# Patient Record
Sex: FEMALE | Race: WHITE | NOT HISPANIC OR LATINO | Employment: FULL TIME | URBAN - METROPOLITAN AREA
[De-identification: names, ages, dates, MRNs, and addresses within clinical notes are randomized per-mention and may not be internally consistent; named-entity substitution may affect disease eponyms.]

---

## 2017-01-20 ENCOUNTER — OFFICE VISIT (OUTPATIENT)
Dept: OBSTETRICS AND GYNECOLOGY | Facility: CLINIC | Age: 57
End: 2017-01-20

## 2017-01-20 VITALS
HEIGHT: 66 IN | DIASTOLIC BLOOD PRESSURE: 80 MMHG | WEIGHT: 177 LBS | BODY MASS INDEX: 28.45 KG/M2 | SYSTOLIC BLOOD PRESSURE: 120 MMHG | RESPIRATION RATE: 14 BRPM

## 2017-01-20 DIAGNOSIS — Z01.419 WELL FEMALE EXAM WITH ROUTINE GYNECOLOGICAL EXAM: Primary | ICD-10-CM

## 2017-01-20 PROCEDURE — 99396 PREV VISIT EST AGE 40-64: CPT | Performed by: OBSTETRICS & GYNECOLOGY

## 2017-01-20 RX ORDER — ESTRADIOL 1 MG/1
1 TABLET ORAL DAILY
Qty: 30 TABLET | Refills: 12 | Status: SHIPPED | OUTPATIENT
Start: 2017-01-20 | End: 2018-12-10 | Stop reason: ALTCHOICE

## 2017-01-20 NOTE — MR AVS SNAPSHOT
Lisseth Cota   2017 9:30 AM   Office Visit    Dept Phone:  [phone]   Encounter #:  [id number]    Provider:  Ilia Jackman MD   Department:  [facility] MEDICAL [facility] WOMEN'S [facility]                Your Full Care Plan              Where to Get Your Medications      These medications were sent to [address] - [phone]  - [phone] [address] [address]     Phone:  [phone]     estradiol 1 MG tablet            Your Updated Medication List          This list is accurate as of: 17 12:13 PM.  Always use your most recent med list.                estradiol 1 MG tablet   Commonly known as:  ESTRACE   Take 1 tablet by mouth Daily.       MULTI-VITAMIN DAILY PO       naproxen 500 MG tablet   Commonly known as:  NAPROSYN   Take 1 tablet by mouth 2 (two) times a day with meals.               You Were Diagnosed With        Codes Comments    Well female exam with routine gynecological exam    -  Primary ICD-10-CM: Z01.419  ICD-9-CM: V72.31       Instructions     None    Patient Instructions History      Upcoming Appointments     Visit Type Date Time Department    ANNUAL 2017  9:30 AM [facility]      MyChart Signup     [facility] [email or web address] allows you to send messages to your doctor, view your test results, renew your prescriptions, schedule appointments, and more. To sign up, go to [employer] and click on the Sign Up Now link in the New User? box. Enter your [email or web address] Activation Code exactly as it appears below along with the last four digits of your Social Security Number and your Date of Birth () to complete the sign-up process. If you do not sign up before the expiration date, you must request a new code.    [email or web address] Activation Code: [email or web address]  Expires: 2/3/2017 12:13 PM    If you have questions, you can email [email or web address] or call  "[phone] to talk to our MyChart staff. Remember, MyChart is NOT to be used for urgent needs. For medical emergencies, dial 911.               Other Info from Your Visit           Allergies     Sulfa Antibiotics  Nausea Only    Penicillins  Rash      Reason for Visit     Gynecologic Exam annual      Vital Signs     Blood Pressure Respirations Height Weight Body Mass Index Smoking Status    120/80 14 65.5\" (166.4 cm) 177 lb (80.3 kg) 29.01 kg/m2 Never Smoker      Problems and Diagnoses Noted     Well female exam with routine gynecological exam    -  Primary        "

## 2017-01-20 NOTE — PROGRESS NOTES
"Chief Complaint: Needs annual exam    Lisseth Cota is a 56 y.o.  Ab1 status post TVH-ovaries retained-presenting for annual exam.  She is on estradiol 1 mg doing well would like to continue.  Fibrocystic breast-stable mammograms.  No urinary or bowel symptoms, no gynecologic complaints.  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 Santamaria-has not had any residual issues      Pertinent items are noted in HPI.       Visit Vitals   • /80   • Resp 14   • Ht 65.5\" (166.4 cm)   • Wt 177 lb (80.3 kg)   • BMI 29.01 kg/m2            Physical Exam    General well developed; well nourished  no acute distress   Skin No suspicious lesions seen   Thyroid normal to inspection and palpation   Lungs breathing is unlabored  clear to auscultation bilaterally   Cardiac regular rate and rhythm, S1, S2 normal, no murmur, click, rub or gallop   Breasts Examined in supine position  Symmetric without masses or skin dimpling  Nipples normal without inversion, lesions or discharge  There are no palpable axillary nodes   Abdomen soft, non-tender; no masses  no umbilical or inginual hernias are present  no hepato-splenomegaly   GYNpelvic Clinical staff was present for exam  External genitalia:  normal appearance of the external genitalia including Bartholin's and [place]'s glands.  :  urethral meatus normal; urethral hypermobility is absent.  Vaginal:  normal pink mucosa without prolapse or lesions. Pap smear obtained  Cervix:  absent.  Uterus:  absent.  Adnexa:  normal bimanual exam of the adnexa.  Rectal:  digital rectal exam not performed; anus visually normal appearing.       Assessment:   1. Status post DAKOTA ovaries retained-doing well  2. HRT no problems would like to continue  3. Fibrocystic breast-encourage mammogram    Plan:  1. Estradiol 1 mg    "

## 2017-09-14 DIAGNOSIS — Z00.00 PHYSICAL EXAM: Primary | ICD-10-CM

## 2017-09-15 ENCOUNTER — LAB (OUTPATIENT)
Dept: FAMILY MEDICINE CLINIC | Facility: CLINIC | Age: 57
End: 2017-09-15

## 2017-09-15 DIAGNOSIS — Z00.00 PHYSICAL EXAM: ICD-10-CM

## 2017-09-15 LAB
25(OH)D3 SERPL-MCNC: 34 NG/ML
ALBUMIN SERPL-MCNC: 4.4 G/DL (ref 3.5–5)
ALBUMIN/GLOB SERPL: 1.5 G/DL (ref 1.5–2.5)
ALP SERPL-CCNC: 60 U/L (ref 35–104)
ALT SERPL W P-5'-P-CCNC: 18 U/L (ref 10–36)
ANION GAP SERPL CALCULATED.3IONS-SCNC: 6 MMOL/L (ref 3.6–11.2)
AST SERPL-CCNC: 21 U/L (ref 10–30)
BASOPHILS # BLD AUTO: 0.03 10*3/MM3 (ref 0–0.3)
BASOPHILS NFR BLD AUTO: 0.6 % (ref 0–2)
BILIRUB SERPL-MCNC: 0.6 MG/DL (ref 0.2–1.8)
BUN BLD-MCNC: 11 MG/DL (ref 7–21)
BUN/CREAT SERPL: 16.2 (ref 7–25)
CALCIUM SPEC-SCNC: 9.6 MG/DL (ref 7.7–10)
CHLORIDE SERPL-SCNC: 109 MMOL/L (ref 99–112)
CHOLEST SERPL-MCNC: 176 MG/DL (ref 0–200)
CO2 SERPL-SCNC: 25 MMOL/L (ref 24.3–31.9)
CREAT BLD-MCNC: 0.68 MG/DL (ref 0.43–1.29)
DEPRECATED RDW RBC AUTO: 40.9 FL (ref 37–54)
EOSINOPHIL # BLD AUTO: 0.1 10*3/MM3 (ref 0–0.7)
EOSINOPHIL NFR BLD AUTO: 1.9 % (ref 0–5)
ERYTHROCYTE [DISTWIDTH] IN BLOOD BY AUTOMATED COUNT: 12.4 % (ref 11.5–14.5)
GFR SERPL CREATININE-BSD FRML MDRD: 90 ML/MIN/1.73
GLOBULIN UR ELPH-MCNC: 3 GM/DL
GLUCOSE BLD-MCNC: 104 MG/DL (ref 70–110)
HCT VFR BLD AUTO: 42.1 % (ref 37–47)
HCV AB SER DONR QL: NORMAL
HDLC SERPL-MCNC: 52 MG/DL (ref 60–100)
HGB BLD-MCNC: 14 G/DL (ref 12–16)
IMM GRANULOCYTES # BLD: 0.01 10*3/MM3 (ref 0–0.03)
IMM GRANULOCYTES NFR BLD: 0.2 % (ref 0–0.5)
LDLC SERPL CALC-MCNC: 113 MG/DL (ref 0–100)
LDLC/HDLC SERPL: 2.17 {RATIO}
LYMPHOCYTES # BLD AUTO: 1.61 10*3/MM3 (ref 1–3)
LYMPHOCYTES NFR BLD AUTO: 30 % (ref 21–51)
MCH RBC QN AUTO: 31 PG (ref 27–33)
MCHC RBC AUTO-ENTMCNC: 33.3 G/DL (ref 33–37)
MCV RBC AUTO: 93.1 FL (ref 80–94)
MONOCYTES # BLD AUTO: 0.41 10*3/MM3 (ref 0.1–0.9)
MONOCYTES NFR BLD AUTO: 7.6 % (ref 0–10)
NEUTROPHILS # BLD AUTO: 3.21 10*3/MM3 (ref 1.4–6.5)
NEUTROPHILS NFR BLD AUTO: 59.7 % (ref 30–70)
OSMOLALITY SERPL CALC.SUM OF ELEC: 279.1 MOSM/KG (ref 273–305)
PLATELET # BLD AUTO: 267 10*3/MM3 (ref 130–400)
PMV BLD AUTO: 10.1 FL (ref 6–10)
POTASSIUM BLD-SCNC: 4.3 MMOL/L (ref 3.5–5.3)
PROT SERPL-MCNC: 7.4 G/DL (ref 6–8)
RBC # BLD AUTO: 4.52 10*6/MM3 (ref 4.2–5.4)
SODIUM BLD-SCNC: 140 MMOL/L (ref 135–153)
TRIGL SERPL-MCNC: 57 MG/DL (ref 0–150)
TSH SERPL DL<=0.05 MIU/L-ACNC: 1.08 MIU/ML (ref 0.55–4.78)
VLDLC SERPL-MCNC: 11.4 MG/DL
WBC NRBC COR # BLD: 5.37 10*3/MM3 (ref 4.5–12.5)

## 2017-09-15 PROCEDURE — 82306 VITAMIN D 25 HYDROXY: CPT | Performed by: FAMILY MEDICINE

## 2017-09-15 PROCEDURE — 80053 COMPREHEN METABOLIC PANEL: CPT | Performed by: FAMILY MEDICINE

## 2017-09-15 PROCEDURE — 86803 HEPATITIS C AB TEST: CPT | Performed by: FAMILY MEDICINE

## 2017-09-15 PROCEDURE — 84443 ASSAY THYROID STIM HORMONE: CPT | Performed by: FAMILY MEDICINE

## 2017-09-15 PROCEDURE — 85025 COMPLETE CBC W/AUTO DIFF WBC: CPT | Performed by: FAMILY MEDICINE

## 2017-09-15 PROCEDURE — 80061 LIPID PANEL: CPT | Performed by: FAMILY MEDICINE

## 2017-09-15 PROCEDURE — 36415 COLL VENOUS BLD VENIPUNCTURE: CPT | Performed by: NURSE PRACTITIONER

## 2017-09-19 ENCOUNTER — OFFICE VISIT (OUTPATIENT)
Dept: FAMILY MEDICINE CLINIC | Facility: CLINIC | Age: 57
End: 2017-09-19

## 2017-09-19 VITALS
HEIGHT: 66 IN | TEMPERATURE: 98.2 F | DIASTOLIC BLOOD PRESSURE: 72 MMHG | BODY MASS INDEX: 27.98 KG/M2 | WEIGHT: 174.1 LBS | SYSTOLIC BLOOD PRESSURE: 126 MMHG | HEART RATE: 82 BPM

## 2017-09-19 DIAGNOSIS — Z00.00 WELL ADULT EXAM: Primary | ICD-10-CM

## 2017-09-19 DIAGNOSIS — Z12.11 SCREENING FOR COLON CANCER: ICD-10-CM

## 2017-09-19 PROCEDURE — 99396 PREV VISIT EST AGE 40-64: CPT | Performed by: FAMILY MEDICINE

## 2017-09-19 PROCEDURE — 90471 IMMUNIZATION ADMIN: CPT | Performed by: FAMILY MEDICINE

## 2017-09-19 PROCEDURE — 90715 TDAP VACCINE 7 YRS/> IM: CPT | Performed by: FAMILY MEDICINE

## 2017-09-25 ENCOUNTER — TELEPHONE (OUTPATIENT)
Dept: GASTROENTEROLOGY | Facility: CLINIC | Age: 57
End: 2017-09-25

## 2017-09-25 ENCOUNTER — DOCUMENTATION (OUTPATIENT)
Dept: GASTROENTEROLOGY | Facility: CLINIC | Age: 57
End: 2017-09-25

## 2017-09-25 DIAGNOSIS — Z12.11 COLON CANCER SCREENING: Primary | ICD-10-CM

## 2017-09-25 NOTE — TELEPHONE ENCOUNTER
Can you please put a case request in for patient to have a Screening Colonoscopy? She is going to purchase Mag. Citrate OTC.     Thank you

## 2017-09-25 NOTE — PROGRESS NOTES
Spoke with patient and she is a Screening Colonoscopy. I scheduled her for 11-28-17 at 9:00 arrival time. I went over instructions with her as well as mailed them to her. Pt. stated that she does not take any ASA products.

## 2017-09-26 PROBLEM — Z12.11 COLON CANCER SCREENING: Status: ACTIVE | Noted: 2017-09-26

## 2017-12-15 ENCOUNTER — HOSPITAL ENCOUNTER (OUTPATIENT)
Facility: HOSPITAL | Age: 57
Setting detail: HOSPITAL OUTPATIENT SURGERY
Discharge: HOME | End: 2017-12-15
Attending: INTERNAL MEDICINE | Admitting: INTERNAL MEDICINE

## 2017-12-15 ENCOUNTER — ANESTHESIA EVENT (OUTPATIENT)
Dept: PERIOP | Facility: HOSPITAL | Age: 57
End: 2017-12-15

## 2017-12-15 ENCOUNTER — ANESTHESIA (OUTPATIENT)
Dept: PERIOP | Facility: HOSPITAL | Age: 57
End: 2017-12-15

## 2017-12-15 VITALS
TEMPERATURE: 97.9 F | HEART RATE: 67 BPM | HEIGHT: 65 IN | WEIGHT: 167 LBS | BODY MASS INDEX: 27.82 KG/M2 | OXYGEN SATURATION: 99 % | RESPIRATION RATE: 18 BRPM | DIASTOLIC BLOOD PRESSURE: 72 MMHG | SYSTOLIC BLOOD PRESSURE: 110 MMHG

## 2017-12-15 PROBLEM — Z12.11 COLON CANCER SCREENING: Status: ACTIVE | Noted: 2017-09-26

## 2017-12-15 PROCEDURE — 25010000002 PROPOFOL 10 MG/ML EMULSION: Performed by: NURSE ANESTHETIST, CERTIFIED REGISTERED

## 2017-12-15 PROCEDURE — 25010000002 PROPOFOL 1000 MG/ML EMULSION: Performed by: NURSE ANESTHETIST, CERTIFIED REGISTERED

## 2017-12-15 PROCEDURE — 45378 DIAGNOSTIC COLONOSCOPY: CPT | Performed by: INTERNAL MEDICINE

## 2017-12-15 RX ORDER — PROPOFOL 10 MG/ML
VIAL (ML) INTRAVENOUS AS NEEDED
Status: DISCONTINUED | OUTPATIENT
Start: 2017-12-15 | End: 2017-12-15 | Stop reason: SURG

## 2017-12-15 RX ORDER — LIDOCAINE HYDROCHLORIDE 20 MG/ML
INJECTION, SOLUTION INFILTRATION; PERINEURAL AS NEEDED
Status: DISCONTINUED | OUTPATIENT
Start: 2017-12-15 | End: 2017-12-15 | Stop reason: SURG

## 2017-12-15 RX ORDER — IPRATROPIUM BROMIDE AND ALBUTEROL SULFATE 2.5; .5 MG/3ML; MG/3ML
3 SOLUTION RESPIRATORY (INHALATION) ONCE AS NEEDED
Status: DISCONTINUED | OUTPATIENT
Start: 2017-12-15 | End: 2017-12-15 | Stop reason: HOSPADM

## 2017-12-15 RX ORDER — ONDANSETRON 2 MG/ML
4 INJECTION INTRAMUSCULAR; INTRAVENOUS ONCE AS NEEDED
Status: DISCONTINUED | OUTPATIENT
Start: 2017-12-15 | End: 2017-12-15 | Stop reason: HOSPADM

## 2017-12-15 RX ORDER — MEPERIDINE HYDROCHLORIDE 25 MG/ML
12.5 INJECTION INTRAMUSCULAR; INTRAVENOUS; SUBCUTANEOUS
Status: DISCONTINUED | OUTPATIENT
Start: 2017-12-15 | End: 2017-12-15 | Stop reason: HOSPADM

## 2017-12-15 RX ORDER — SODIUM CHLORIDE, SODIUM LACTATE, POTASSIUM CHLORIDE, CALCIUM CHLORIDE 600; 310; 30; 20 MG/100ML; MG/100ML; MG/100ML; MG/100ML
INJECTION, SOLUTION INTRAVENOUS CONTINUOUS PRN
Status: DISCONTINUED | OUTPATIENT
Start: 2017-12-15 | End: 2017-12-15 | Stop reason: SURG

## 2017-12-15 RX ORDER — OXYCODONE HYDROCHLORIDE AND ACETAMINOPHEN 5; 325 MG/1; MG/1
1 TABLET ORAL ONCE AS NEEDED
Status: DISCONTINUED | OUTPATIENT
Start: 2017-12-15 | End: 2017-12-15 | Stop reason: HOSPADM

## 2017-12-15 RX ORDER — FENTANYL CITRATE 50 UG/ML
50 INJECTION, SOLUTION INTRAMUSCULAR; INTRAVENOUS
Status: DISCONTINUED | OUTPATIENT
Start: 2017-12-15 | End: 2017-12-15 | Stop reason: HOSPADM

## 2017-12-15 RX ADMIN — PROPOFOL 30 MG: 10 INJECTION, EMULSION INTRAVENOUS at 10:52

## 2017-12-15 RX ADMIN — SODIUM CHLORIDE, POTASSIUM CHLORIDE, SODIUM LACTATE AND CALCIUM CHLORIDE: 600; 310; 30; 20 INJECTION, SOLUTION INTRAVENOUS at 10:51

## 2017-12-15 RX ADMIN — PROPOFOL 200 MCG/KG/MIN: 10 INJECTION, EMULSION INTRAVENOUS at 10:52

## 2017-12-15 RX ADMIN — LIDOCAINE HYDROCHLORIDE 40 MG: 20 INJECTION, SOLUTION INFILTRATION; PERINEURAL at 10:52

## 2017-12-15 NOTE — ANESTHESIA POSTPROCEDURE EVALUATION
Patient: Lisseth Cota    Procedure Summary     Date Anesthesia Start Anesthesia Stop Room / Location    12/15/17 1051 1111 [facility] / [facility]       Procedure Diagnosis Surgeon Provider    COLONOSCOPY FOR SCREENING  CPTCODE:35621 (N/A ) Colon cancer screening  (Colon cancer screening [Z12.11]) MD Gama Morales III, MD          Anesthesia Type: general  Last vitals  BP   103/61 (12/15/17 0918)   Temp   99 °F (37.2 °C) (12/15/17 0918)   Pulse   79 (12/15/17 0918)   Resp   18 (12/15/17 0918)     SpO2   97 % (12/15/17 0918)     Post Anesthesia Care and Evaluation    Patient location during evaluation: bedside  Patient participation: complete - patient participated  Level of consciousness: awake and alert  Pain score: 1  Pain management: adequate  Airway patency: patent  Anesthetic complications: No anesthetic complications  PONV Status: none  Cardiovascular status: acceptable  Respiratory status: acceptable  Hydration status: acceptable

## 2017-12-15 NOTE — ANESTHESIA PREPROCEDURE EVALUATION
Anesthesia Evaluation     no history of anesthetic complications:  NPO Solid Status: > 8 hours  NPO Liquid Status: > 8 hours     Airway   Mallampati: II  TM distance: <3 FB  Neck ROM: full  no difficulty expected  Dental - normal exam     Pulmonary - negative pulmonary ROS and normal exam   Cardiovascular - negative cardio ROS and normal exam        Neuro/Psych- negative ROS  GI/Hepatic/Renal/Endo - negative ROS     Musculoskeletal (-) negative ROS    Abdominal  - normal exam   Substance History - negative use     OB/GYN negative ob/gyn ROS         Other                                        Anesthesia Plan    ASA 2     general     intravenous induction   Anesthetic plan and risks discussed with patient.

## 2017-12-15 NOTE — OP NOTE
12/15/17    COLONOSCOPY FOR SCREENING  Procedure Note    Lisseth Cota  12/15/2017    Pre-op Diagnosis: Colon cancer screening, no prior colonoscopy      Post-op Diagnosis: Normal colonoscopy        Anesthesia: Per Anesthesia service, general anesthesia      Estimated Blood Loss: None      Findings: Normal rectal examination.  Colonoscopy performed to the cecum, confirmed by identification of typical cecal anatomy.  Bowel preparation was fair but adequate.  The scope was retroflexed within the rectum.  All areas examined carefully and no abnormality was seen.  She tolerated the procedure well and there were no complications.  She left the OR in stable and satisfactory condition.      Complications: None      Recommendations:   Findings discussed with the patient  Repeat screening/surveillance colonoscopy in about 10 years      Bimal Hoang III, MD     Date: 12/15/2017  Time: 11:10 AM

## 2017-12-15 NOTE — H&P
"History and physical examination  December 15, 2017    HPI  57-year-old white female presents for screening colonoscopy.  No prior colonoscopy.  Family history negative for colon cancer.      Review of Systems  Negative and noncontributory.    ACTIVE PROBLEMS:   Specialty Problems     None          PAST MEDICAL HISTORY:  Past Medical History:   Diagnosis Date   • Adenomyosis    • Endometriosis    • Enlarged uterus    • Fibrocystic breast    • Fibroid uterus    • Menopausal symptoms    • S/P total abdominal hysterectomy     ovaries remain   • Uterine fibroid    • UTI symptoms        SURGICAL HISTORY:  Past Surgical History:   Procedure Laterality Date   • BLADDER SURGERY     •  SECTION      x3   •  SECTION     •  SECTION     •  SECTION     • HYSTERECTOMY     • TOTAL ABDOMINAL HYSTERECTOMY  2011       FAMILY HISTORY:  Family History   Problem Relation Age of Onset   • Heart disease Mother      by-pass   • Heart disease Father    • Neuropathy Father    • Hypertension Father        SOCIAL HISTORY:  Social History   Substance Use Topics   • Smoking status: Never Smoker   • Smokeless tobacco: Never Used   • Alcohol use No       CURRENT MEDICATION:  No current facility-administered medications for this encounter.      I have reviewed her list of her current medications.    ALLERGIES:  Sulfa antibiotics and Penicillins    VISIT VITALS:  /61 (BP Location: Right arm, Patient Position: Sitting)  Pulse 79  Temp 99 °F (37.2 °C) (Tympanic)   Resp 18  Ht 165.1 cm (65\")  Wt 75.8 kg (167 lb)  SpO2 97%  BMI 27.79 kg/m2    PHYSICAL EXAMINATION:  Physical Exam   Alert, oriented ×3  HEENT: Normal  Neck: No mass  Chest: Clear  Heart: Regular rhythm  Abdomen: Soft, nontender, active BS  Extremities: No edema  Neuro: No focal deficit    Assessment/Plan   Colon cancer screening    REC  Screening colonoscopy is planned.  Procedure, benefits, risks and alternatives were explained " to the patient.         Bimal Hoang III, MD

## 2018-05-01 ENCOUNTER — OFFICE VISIT (OUTPATIENT)
Dept: FAMILY MEDICINE CLINIC | Facility: CLINIC | Age: 58
End: 2018-05-01

## 2018-05-01 VITALS
DIASTOLIC BLOOD PRESSURE: 67 MMHG | WEIGHT: 181.4 LBS | HEART RATE: 73 BPM | TEMPERATURE: 97.5 F | HEIGHT: 65 IN | BODY MASS INDEX: 30.22 KG/M2 | SYSTOLIC BLOOD PRESSURE: 105 MMHG
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
## 2018-05-01 PROCEDURE — 99213 OFFICE O/P EST LOW 20 MIN: CPT | Performed by: FAMILY MEDICINE

## 2018-05-01 NOTE — PROGRESS NOTES
"Subjective     Chief Complaint   Patient presents with   • Tick Removal       Lisseth Cota is a 57 y.o. female.     History of Present Illness removed a tick from left inguinal region yesterday.  Had been on only briefly.  Nodular area but no rash.  Actually better today.  Tetanus current.  No other new concerns.  Has not been ill.    The following portions of the patient's history were reviewed and updated as appropriate: allergies, current medications, past family history, past social history, past surgical history and problem list.    Review of Systems see history of present illness    Objective   Physical Exam   Constitutional: She is oriented to person, place, and time. She appears well-developed and well-nourished.   Neurological: She is oriented to person, place, and time.   Skin: Skin is warm and dry. No rash noted.   Small papule left inguinal area.  Not pustular.  No rash.  No retained foreign body seen   Psychiatric: She has a normal mood and affect.   Vitals reviewed.    /67 (BP Location: Left arm, Patient Position: Sitting, Cuff Size: Adult)   Pulse 73   Temp 97.5 °F (36.4 °C) (Oral)   Ht 165.1 cm (65\")   Wt 82.3 kg (181 lb 6.4 oz)   BMI 30.19 kg/m²   Assessment/Plan   Lisseth was seen today for tick removal.    Diagnoses and all orders for this visit:    Tick bite, initial encounter     Discussed pros and cons of medication.  At this time will observe.  Discussed changes of concern.  Follow-up as needed.           "

## 2018-07-19 ENCOUNTER — OFFICE VISIT (OUTPATIENT)
Dept: FAMILY MEDICINE CLINIC | Facility: CLINIC | Age: 58
End: 2018-07-19

## 2018-07-19 VITALS
HEART RATE: 91 BPM | SYSTOLIC BLOOD PRESSURE: 125 MMHG | HEIGHT: 65 IN | DIASTOLIC BLOOD PRESSURE: 65 MMHG | WEIGHT: 180.3 LBS | BODY MASS INDEX: 30.04 KG/M2 | TEMPERATURE: 97.4 F
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
## 2018-07-19 PROCEDURE — 99213 OFFICE O/P EST LOW 20 MIN: CPT | Performed by: FAMILY MEDICINE

## 2018-07-19 RX ORDER — DOXYCYCLINE 100 MG/1
100 CAPSULE ORAL EVERY 12 HOURS SCHEDULED
Qty: 20 CAPSULE | Refills: 0 | Status: SHIPPED | OUTPATIENT
Start: 2018-07-19 | End: 2018-07-29

## 2018-07-19 NOTE — PROGRESS NOTES
"Subjective     Chief Complaint   Patient presents with   • Tick bite       Lisseth Cota is a 58 y.o. female.     History of Present Illness pull a tick off left anterior lower neck 2 days ago.  Slightly irritated with the appearance of circular rash.  No other problems.  Second tick of season for her.    The following portions of the patient's history were reviewed and updated as appropriate: allergies, current medications, past medical history, past social history, past surgical history and problem list.    Review of Systems see history of present illness    Objective   Physical Exam   Constitutional: She appears well-developed and well-nourished.   Neurological: She is alert.   Skin: Skin is warm and dry.   Minimally irritated lesion left anterior lower neck.  Mild circumferential redness.   Vitals reviewed.    /65 (BP Location: Left arm, Patient Position: Sitting, Cuff Size: Adult)   Pulse 91   Temp 97.4 °F (36.3 °C) (Oral)   Ht 165.1 cm (65\")   Wt 81.8 kg (180 lb 4.8 oz)   BMI 30.00 kg/m²   Assessment/Plan   Lisseth was seen today for tick bite.    Diagnoses and all orders for this visit:    Tick bite, initial encounter  -     doxycycline (MONODOX) 100 MG capsule; Take 1 capsule by mouth Every 12 (Twelve) Hours for 10 days.      Antibiotic.  Continue observation.  Tetanus current.  No reason to check titer.  Follow-up or contact if problems.         "

## 2018-12-10 ENCOUNTER — OFFICE VISIT (OUTPATIENT)
Dept: OBSTETRICS AND GYNECOLOGY | Facility: CLINIC | Age: 58
End: 2018-12-10

## 2018-12-10 VITALS
SYSTOLIC BLOOD PRESSURE: 114 MMHG | HEIGHT: 65 IN | BODY MASS INDEX: 29.36 KG/M2 | WEIGHT: 176.2 LBS | DIASTOLIC BLOOD PRESSURE: 62 MMHG

## 2018-12-10 DIAGNOSIS — Z01.419 ENCNTR FOR GYN EXAM (GENERAL) (ROUTINE) W/O ABN FINDINGS: Primary | ICD-10-CM

## 2018-12-10 DIAGNOSIS — Z12.31 SCREENING MAMMOGRAM, ENCOUNTER FOR: ICD-10-CM

## 2018-12-10 DIAGNOSIS — N94.10 FEMALE DYSPAREUNIA: ICD-10-CM

## 2018-12-10 PROCEDURE — 99396 PREV VISIT EST AGE 40-64: CPT | Performed by: OBSTETRICS & GYNECOLOGY

## 2018-12-10 RX ORDER — ASPIRIN 81 MG/1
TABLET, COATED ORAL
COMMUNITY
Start: 2018-10-25 | End: 2022-10-24

## 2018-12-10 NOTE — PROGRESS NOTES
"Subjective   Chief Complaint   Patient presents with   • Gynecologic Exam     annual exam     Lisseth Cota is a 58 y.o. year old  who is post-menopausal.  She is S/P hysterectomy presenting to be seen for her annual exam.  This past year she had been been on hormone replacement therapy until recent knee surgery where she discontinued prior to surgery and has not resumed.  There has not been vaginal bleeding in the last 12 months.  Menopausal symptoms are present (hot flashes) but not affecting her quality of life . Denies history of abnormal pap smears. Last mammogram: two years ago. Last colonoscopy: 2018.    SEXUAL Hx:  She is currently sexually active.  In the past year there there has been NO new sexual partners.    Condoms are never used.  She would not like to be screened for STD's at today's exam.  [place] is painful: yes - but has not tried OTC lubricants   HEALTH Hx:  She exercises regularly: yes.  She wears her seat belt: yes.  She has concerns about domestic violence: no.  OTHER THINGS SHE WANTS TO DISCUSS TODAY:  Nothing else    The following portions of the patient's history were reviewed and updated as appropriate:problem list, current medications, allergies, past family history, past medical history, past social history and past surgical history.    Social History    Tobacco Use      Smoking status: Never Smoker      Smokeless tobacco: Never Used    Review of Systems  Constitutional POS: nothing reported    NEG: anorexia or night sweats   Genitourinary POS: nothing reported    NEG: dysuria or hematuria   Gastointestinal POS: nothing reported    NEG: bloating, change in bowel habits, melena or reflux symptoms   Integument POS: nothing reported    NEG: moles that are changing in size, shape, color or rashes   Breast POS: nothing reported    NEG: persistent breast lump, skin dimpling or nipple discharge        Objective   /62   Ht 165.1 cm (65\")   Wt 79.9 kg (176 lb 3.2 oz)   BMI " 29.32 kg/m²     General:  well developed; well nourished  no acute distress   Skin:  No suspicious lesions seen   Thyroid: normal to inspection and palpation   Breasts:  Examined in supine position  Symmetric without masses or skin dimpling  Nipples normal without inversion, lesions or discharge  There are no palpable axillary nodes   Abdomen: soft, non-tender; no masses  no umbilical or inguinal hernias are present  no hepato-splenomegaly   Pelvis: Clinical staff was present for exam  External genitalia:  normal appearance of the external genitalia including Bartholin's and [place]'s glands.  :  urethral meatus normal;  Vaginal:  atrophic mucosal changes are present;  Cervix:  absent.  Uterus:  absent.  Adnexa:  normal bimanual exam of the adnexa.        Assessment   1. Normal GYN exam in postmenopausal female s/p DAKOTA  2. Dyspareunia  3. She is up to date on all relevant gynecologic and colorectal screenings except mammography     Plan   1. Pap was not done today.  I explained to Lisseth that the Pap smears are no longer recommended in patient's after hysterectomy.   I stressed to Lisseth that she still should be seen to be seen yearly for a full physical including breast and pelvic exam.  2. She was encouraged to get yearly mammograms.  She should report any palpable breast lump(s) or skin changes regardless of mammographic findings.  I explained to Lisseth that notification regarding her mammogram results will come from the center performing the study.  Our office will not be routinely calling with mammogram results.  It is her responsibility to make sure that the results from the mammogram are communicated to her by the breast center.  If she has any questions about the results, she is welcome to call our office anytime.  3. Patient given samples of Intrarosa. Will send prescription in if patient notices improvement in symptoms.  4. The importance of keeping all planned follow-up and taking all medications as  prescribed was emphasized.  5. Follow up for annual exam in 1 year.    No orders of the defined types were placed in this encounter.         This note was electronically signed.    Lisa Wen, DO  December 10, 2018    Note: Speech recognition transcription software may have been used to create portions of this document.  An attempt at proofreading has been made but errors in transcription could still be present.

## 2019-02-01 ENCOUNTER — OFFICE VISIT (OUTPATIENT)
Dept: FAMILY MEDICINE CLINIC | Facility: CLINIC | Age: 59
End: 2019-02-01

## 2019-02-01 VITALS
HEART RATE: 87 BPM | SYSTOLIC BLOOD PRESSURE: 126 MMHG | DIASTOLIC BLOOD PRESSURE: 68 MMHG | TEMPERATURE: 99 F | HEIGHT: 65 IN | WEIGHT: 178 LBS | BODY MASS INDEX: 29.66 KG/M2

## 2019-02-01 DIAGNOSIS — J02.9 SORE THROAT: Primary | ICD-10-CM

## 2019-02-01 DIAGNOSIS — Z12.31 SCREENING MAMMOGRAM, ENCOUNTER FOR: ICD-10-CM

## 2019-02-01 DIAGNOSIS — J01.00 ACUTE MAXILLARY SINUSITIS, RECURRENCE NOT SPECIFIED: ICD-10-CM

## 2019-02-01 LAB
EXPIRATION DATE: NORMAL
INTERNAL CONTROL: NORMAL
Lab: NORMAL
S PYO AG THROAT QL: NEGATIVE

## 2019-02-01 PROCEDURE — 87880 STREP A ASSAY W/OPTIC: CPT | Performed by: NURSE PRACTITIONER

## 2019-02-01 PROCEDURE — 99213 OFFICE O/P EST LOW 20 MIN: CPT | Performed by: NURSE PRACTITIONER

## 2019-02-01 RX ORDER — DOXYCYCLINE 100 MG/1
100 CAPSULE ORAL 2 TIMES DAILY
Qty: 20 CAPSULE | Refills: 0 | Status: SHIPPED | OUTPATIENT
Start: 2019-02-01 | End: 2019-02-11

## 2019-02-01 NOTE — PROGRESS NOTES
"Subjective   Lisseth Cota is a 58 y.o. female.     Patient is presenting today for new onset symptoms.  Symptoms started 2 days ago.  She is having upper respiratory symptoms that throat is severely sore.   Mucus is clear.  Sinus pain pressure \"eyes\"hurt.  He has low-grade temperature today of 99.  Denies any nausea or vomiting or GI symptoms. No chest pain or shortness of breath.          The following portions of the patient's history were reviewed and updated as appropriate: past family history, past social history and past surgical history.    Review of Systems   Constitutional: Positive for chills and fever. Negative for appetite change and fatigue.   HENT: Positive for congestion, rhinorrhea and sinus pressure. Negative for sore throat, trouble swallowing and voice change.    Eyes: Negative for discharge, itching and visual disturbance.   Respiratory: Negative for cough, choking, chest tightness and shortness of breath.    Cardiovascular: Negative for chest pain, palpitations and leg swelling.   Gastrointestinal: Negative for abdominal pain, blood in stool, constipation, diarrhea and nausea.   Genitourinary: Negative for dysuria, frequency and hematuria.   Musculoskeletal: Negative for arthralgias, back pain and myalgias.   Skin: Negative for rash and wound.   Allergic/Immunologic: Positive for environmental allergies.   Neurological: Negative for dizziness, weakness and headaches.   Hematological: Does not bruise/bleed easily.   Psychiatric/Behavioral: Negative for sleep disturbance. The patient is not nervous/anxious.        Objective   Physical Exam   Constitutional: She appears well-developed. No distress.   HENT:   Head: Normocephalic and atraumatic.   Nose: Mucosal edema and rhinorrhea present. Right sinus exhibits maxillary sinus tenderness. Left sinus exhibits maxillary sinus tenderness.   Eyes: Conjunctivae and EOM are normal.   Neck: Neck supple. No JVD present.   Cardiovascular: Normal rate and " normal heart sounds.   Pulmonary/Chest: Effort normal and breath sounds normal. No respiratory distress.   Abdominal: Soft. Bowel sounds are normal. She exhibits no distension.   Musculoskeletal: Normal range of motion. She exhibits no edema.   Neurological: She is alert.   Skin: Skin is warm. No rash noted.   Psychiatric: She has a normal mood and affect.       Assessment/Plan   Lisseth was seen today for sore throat.  Instructed to start cetirizine 10 mg daily over next 2 weeks, use Flonase 2 sprays to each nostril daily over next 2 weeks as well.  She has these medications at home and does not need prescription sent.  Instructed to rest and increase fluids, avoid allergens and irritants.  Strep screen negative today.  We will prescribe doxycycline 100 mg by mouth twice a day for 10 days for sinusitis.  She will only start medication of doxycycline if mucus becomes colored or worsens.  She will schedule mammogram.  Instructed to return to clinic if symptoms worsen or persist.  She verbalizes understanding.  Diagnoses and all orders for this visit:    Sore throat  -     POC Rapid Strep A    Screening mammogram, encounter for  -     Mammo Screening Bilateral With CAD; Future    Acute maxillary sinusitis, recurrence not specified    Other orders  -     Cancel: Mammo Screening Digital Tomosynthesis Bilateral With CAD; Future  -     doxycycline (MONODOX) 100 MG capsule; Take 1 capsule by mouth 2 (Two) Times a Day for 10 days.

## 2019-02-21 ENCOUNTER — HOSPITAL ENCOUNTER (OUTPATIENT)
Dept: MAMMOGRAPHY | Facility: HOSPITAL | Age: 59
Discharge: HOME | End: 2019-02-21
Admitting: NURSE PRACTITIONER

## 2019-02-21 DIAGNOSIS — Z12.31 SCREENING MAMMOGRAM, ENCOUNTER FOR: ICD-10-CM

## 2019-02-21 PROCEDURE — 77067 SCR MAMMO BI INCL CAD: CPT | Performed by: RADIOLOGY

## 2019-02-21 PROCEDURE — 77063 BREAST TOMOSYNTHESIS BI: CPT

## 2019-02-21 PROCEDURE — 77067 SCR MAMMO BI INCL CAD: CPT

## 2019-02-21 PROCEDURE — 77063 BREAST TOMOSYNTHESIS BI: CPT | Performed by: RADIOLOGY

## 2019-06-06 ENCOUNTER — OFFICE VISIT (OUTPATIENT)
Dept: FAMILY MEDICINE CLINIC | Facility: CLINIC | Age: 59
End: 2019-06-06

## 2019-06-06 VITALS
SYSTOLIC BLOOD PRESSURE: 150 MMHG | HEART RATE: 84 BPM | OXYGEN SATURATION: 99 % | HEIGHT: 65 IN | DIASTOLIC BLOOD PRESSURE: 90 MMHG | WEIGHT: 184.4 LBS | BODY MASS INDEX: 30.72 KG/M2 | TEMPERATURE: 97.7 F

## 2019-06-06 DIAGNOSIS — Z13.220 SCREENING FOR HYPERLIPIDEMIA: ICD-10-CM

## 2019-06-06 DIAGNOSIS — M25.50 ARTHRALGIA, UNSPECIFIED JOINT: ICD-10-CM

## 2019-06-06 DIAGNOSIS — S30.861S TICK BITE OF ABDOMEN, SEQUELA: Primary | ICD-10-CM
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
## 2019-06-06 LAB
ALBUMIN SERPL-MCNC: 4.7 G/DL (ref 3.5–5.2)
ALBUMIN/GLOB SERPL: 1.6 G/DL
ALP SERPL-CCNC: 75 U/L (ref 39–117)
ALT SERPL W P-5'-P-CCNC: 39 U/L (ref 1–33)
ANION GAP SERPL CALCULATED.3IONS-SCNC: 12 MMOL/L
AST SERPL-CCNC: 33 U/L (ref 1–32)
BASOPHILS # BLD AUTO: 0.04 10*3/MM3 (ref 0–0.2)
BASOPHILS NFR BLD AUTO: 0.7 % (ref 0–1.5)
BILIRUB SERPL-MCNC: 0.3 MG/DL (ref 0.2–1.2)
BUN BLD-MCNC: 11 MG/DL (ref 6–20)
BUN/CREAT SERPL: 12.5 (ref 7–25)
CALCIUM SPEC-SCNC: 9.8 MG/DL (ref 8.6–10.5)
CHLORIDE SERPL-SCNC: 105 MMOL/L (ref 98–107)
CHOLEST SERPL-MCNC: 180 MG/DL (ref 0–200)
CHROMATIN AB SERPL-ACNC: 14.8 IU/ML (ref 0–14)
CO2 SERPL-SCNC: 25 MMOL/L (ref 22–29)
CREAT BLD-MCNC: 0.88 MG/DL (ref 0.57–1)
CRP SERPL-MCNC: 0.05 MG/DL (ref 0–0.5)
DEPRECATED RDW RBC AUTO: 44.8 FL (ref 37–54)
EOSINOPHIL # BLD AUTO: 0.12 10*3/MM3 (ref 0–0.4)
EOSINOPHIL NFR BLD AUTO: 2.1 % (ref 0.3–6.2)
ERYTHROCYTE [DISTWIDTH] IN BLOOD BY AUTOMATED COUNT: 12.6 % (ref 12.3–15.4)
GFR SERPL CREATININE-BSD FRML MDRD: 66 ML/MIN/1.73
GLOBULIN UR ELPH-MCNC: 3 GM/DL
GLUCOSE BLD-MCNC: 90 MG/DL (ref 65–99)
HCT VFR BLD AUTO: 45.3 % (ref 34–46.6)
HDLC SERPL-MCNC: 53 MG/DL (ref 40–60)
HGB BLD-MCNC: 14.3 G/DL (ref 12–15.9)
IMM GRANULOCYTES # BLD AUTO: 0 10*3/MM3 (ref 0–0.05)
IMM GRANULOCYTES NFR BLD AUTO: 0 % (ref 0–0.5)
LDLC SERPL CALC-MCNC: 114 MG/DL (ref 0–100)
LDLC/HDLC SERPL: 2.16 {RATIO}
LYMPHOCYTES # BLD AUTO: 2.06 10*3/MM3 (ref 0.7–3.1)
LYMPHOCYTES NFR BLD AUTO: 36.6 % (ref 19.6–45.3)
MCH RBC QN AUTO: 30.6 PG (ref 26.6–33)
MCHC RBC AUTO-ENTMCNC: 31.6 G/DL (ref 31.5–35.7)
MCV RBC AUTO: 96.8 FL (ref 79–97)
MONOCYTES # BLD AUTO: 0.43 10*3/MM3 (ref 0.1–0.9)
MONOCYTES NFR BLD AUTO: 7.6 % (ref 5–12)
NEUTROPHILS # BLD AUTO: 2.98 10*3/MM3 (ref 1.7–7)
NEUTROPHILS NFR BLD AUTO: 53 % (ref 42.7–76)
NRBC BLD AUTO-RTO: 0 /100 WBC (ref 0–0.2)
PLATELET # BLD AUTO: 282 10*3/MM3 (ref 140–450)
PMV BLD AUTO: 11.5 FL (ref 6–12)
POTASSIUM BLD-SCNC: 5 MMOL/L (ref 3.5–5.2)
PROT SERPL-MCNC: 7.7 G/DL (ref 6–8.5)
RBC # BLD AUTO: 4.68 10*6/MM3 (ref 3.77–5.28)
SODIUM BLD-SCNC: 142 MMOL/L (ref 136–145)
TRIGL SERPL-MCNC: 63 MG/DL (ref 0–150)
VLDLC SERPL-MCNC: 12.6 MG/DL (ref 5–40)
WBC NRBC COR # BLD: 5.63 10*3/MM3 (ref 3.4–10.8)

## 2019-06-06 PROCEDURE — 85652 RBC SED RATE AUTOMATED: CPT | Performed by: NURSE PRACTITIONER

## 2019-06-06 PROCEDURE — 85025 COMPLETE CBC W/AUTO DIFF WBC: CPT | Performed by: NURSE PRACTITIONER

## 2019-06-06 PROCEDURE — 86431 RHEUMATOID FACTOR QUANT: CPT | Performed by: NURSE PRACTITIONER

## 2019-06-06 PROCEDURE — 86618 LYME DISEASE ANTIBODY: CPT | Performed by: NURSE PRACTITIONER

## 2019-06-06 PROCEDURE — 86200 CCP ANTIBODY: CPT | Performed by: NURSE PRACTITIONER

## 2019-06-06 PROCEDURE — 80061 LIPID PANEL: CPT | Performed by: NURSE PRACTITIONER

## 2019-06-06 PROCEDURE — 86038 ANTINUCLEAR ANTIBODIES: CPT | Performed by: NURSE PRACTITIONER

## 2019-06-06 PROCEDURE — 80053 COMPREHEN METABOLIC PANEL: CPT | Performed by: NURSE PRACTITIONER

## 2019-06-06 PROCEDURE — 36415 COLL VENOUS BLD VENIPUNCTURE: CPT | Performed by: NURSE PRACTITIONER

## 2019-06-06 PROCEDURE — 99213 OFFICE O/P EST LOW 20 MIN: CPT | Performed by: NURSE PRACTITIONER

## 2019-06-06 PROCEDURE — 86140 C-REACTIVE PROTEIN: CPT | Performed by: NURSE PRACTITIONER

## 2019-06-06 NOTE — PATIENT INSTRUCTIONS

## 2019-06-06 NOTE — PROGRESS NOTES
Subjective   Lisseth Cota is a 58 y.o. female.     Chief Complaint   Patient presents with   • Tick Removal       She presents for follow up of tick bite about 6 months ago. She states it was attached for 4 days. She states it had a red knot and sort of a [place] rash. She states she didn't want to take medicine for it because she is very healthy. She states she hurts all over. She states it feels like joint pain. She states she exercises 4 times a week on an elliptical and does light squats. She states she has to take 5 steps to get loose when she stands up. She states she feels like she is 90 years old at times. She c/o feet pain. She describes the pain as dull. She states sitting makes it worse. She states she can't get comfortable at times. She denies family history of arthritis. She states she has never had nose bleeds but she has started having nose bleeds from her left nostril since fall. She states it pools in her hand. She states it happens when she least expects it. She states it occurs all throughout the day. She states it is worse in her hips, knees, and feet.  Her mom had a history of sjogrens and SLE.          The following portions of the patient's history were reviewed and updated as appropriate: allergies, current medications, past family history, past medical history, past social history, past surgical history and problem list.    Review of Systems   Constitutional: Negative for fatigue, fever and unexpected weight change.   HENT: Positive for nosebleeds. Negative for ear pain, rhinorrhea and sore throat.    Eyes: Negative for visual disturbance.   Respiratory: Negative for cough, chest tightness and shortness of breath.    Cardiovascular: Negative for chest pain, palpitations and leg swelling.   Gastrointestinal: Negative for abdominal pain, blood in stool, constipation, diarrhea, nausea and vomiting.   Endocrine: Negative for cold intolerance and heat intolerance.   Genitourinary: Negative for  "dysuria and hematuria.   Musculoskeletal: Positive for arthralgias. Negative for joint swelling and myalgias.   Skin: Negative for color change and rash.   Allergic/Immunologic: Negative for environmental allergies.   Neurological: Negative for dizziness, seizures, syncope and headaches.   Hematological: Negative for adenopathy.   Psychiatric/Behavioral: Negative for suicidal ideas. The patient is not nervous/anxious.        Objective     /90 (BP Location: Left arm, Patient Position: Sitting, Cuff Size: Adult)   Pulse 84   Temp 97.7 °F (36.5 °C)   Ht 165.1 cm (65\")   Wt 83.6 kg (184 lb 6.4 oz)   SpO2 99%   BMI 30.69 kg/m²     Physical Exam   Constitutional: She is oriented to person, place, and time. She appears well-developed and well-nourished. No distress.   HENT:   Head: Normocephalic and atraumatic.   Right Ear: Hearing, tympanic membrane, external ear and ear canal normal.   Left Ear: Hearing, tympanic membrane, external ear and ear canal normal.   Nose: Nose normal. Right sinus exhibits no maxillary sinus tenderness and no frontal sinus tenderness. Left sinus exhibits no maxillary sinus tenderness and no frontal sinus tenderness.   Mouth/Throat: Uvula is midline, oropharynx is clear and moist and mucous membranes are normal.   Eyes: Conjunctivae and lids are normal. Pupils are equal, round, and reactive to light.   Neck: Trachea normal. Neck supple. No tracheal tenderness present. No tracheal deviation present. No thyromegaly present.   Cardiovascular: Normal rate, regular rhythm, S1 normal, S2 normal, normal heart sounds, intact distal pulses and normal pulses. Exam reveals no gallop and no friction rub.   No murmur heard.  Pulmonary/Chest: Effort normal and breath sounds normal. No respiratory distress.   Abdominal: Soft. Bowel sounds are normal. She exhibits no distension. There is no tenderness.   Musculoskeletal: She exhibits no edema.        Right hip: She exhibits normal range of motion, " normal strength, no tenderness, no swelling and no crepitus.        Left hip: She exhibits normal range of motion, normal strength, no tenderness, no bony tenderness, no swelling and no crepitus.        Right knee: She exhibits normal range of motion, no swelling, no effusion, no erythema, no LCL laxity and no MCL laxity. No tenderness found.        Left knee: She exhibits normal range of motion, no swelling, normal alignment, no LCL laxity, normal meniscus and no MCL laxity. No tenderness found.        Right ankle: She exhibits normal range of motion and no swelling. No tenderness.        Left ankle: She exhibits normal range of motion and no swelling. No tenderness.   Neurological: She is alert and oriented to person, place, and time.   Skin: Skin is warm and dry. She is not diaphoretic.        Macule left lower abdomen, scar from tick bite. Soft. No edema.   Psychiatric: She has a normal mood and affect. Her behavior is normal. Judgment and thought content normal.   Vitals reviewed.      Assessment/Plan     Problem List Items Addressed This Visit     None      Visit Diagnoses     Tick bite of abdomen, sequela    -  Primary    Relevant Orders    Comprehensive Metabolic Panel    CBC & Differential    Lyme, Total Antibody Test / Reflex    CBC Auto Differential    Screening for hyperlipidemia        Relevant Orders    Lipid Panel    Arthralgia, unspecified joint        Relevant Orders    AUGUSTO With / DsDNA, RNP, Sjogrens A / B, Smith    C-reactive Protein    Cyclic Citrul Peptide Antibody, IgG / IgA    Rheumatoid Factor    Sedimentation Rate          Plan: Labs ordered to look for cause of joint pain and stiffness. Her blood pressure is elevated today. I have instructed her to check her blood pressure daily and keep a record of her blood pressure readings for review at next visit.     Patient's Body mass index is 30.69 kg/m². BMI is above normal parameters. Recommendations include: educational material.   (Normal BMI:   18.5-24.9, OW 25-29.9, Obesity 30 or greater)        Understands disease processes and need for medications.  Understands reasons for urgent and emergent care.  Patient (& family) verbalized agreement for treatment plan.   Emotional support and active listening provided.  Patient provided time to verbalize feelings.               This document has been electronically signed by ABBIE Schwartz   June 6, 2019 10:00 AM

## 2019-06-07 ENCOUNTER — OFFICE VISIT (OUTPATIENT)
Dept: FAMILY MEDICINE CLINIC | Facility: CLINIC | Age: 59
End: 2019-06-07

## 2019-06-07 VITALS
HEART RATE: 107 BPM | SYSTOLIC BLOOD PRESSURE: 108 MMHG | TEMPERATURE: 97.4 F | OXYGEN SATURATION: 98 % | BODY MASS INDEX: 30.24 KG/M2 | DIASTOLIC BLOOD PRESSURE: 74 MMHG | HEIGHT: 65 IN | WEIGHT: 181.5 LBS

## 2019-06-07 DIAGNOSIS — R76.8 ELEVATED RHEUMATOID FACTOR: Primary | ICD-10-CM

## 2019-06-07 DIAGNOSIS — E78.2 MIXED HYPERLIPIDEMIA: ICD-10-CM

## 2019-06-07 DIAGNOSIS — R74.8 ELEVATED LIVER ENZYMES: ICD-10-CM

## 2019-06-07 DIAGNOSIS — M25.50 ARTHRALGIA, UNSPECIFIED JOINT: ICD-10-CM

## 2019-06-07 LAB
ANA SER QL: NEGATIVE
B BURGDOR IGG+IGM SER-ACNC: <0.91 ISR (ref 0–0.9)
CCP IGA+IGG SERPL IA-ACNC: 2 UNITS (ref 0–19)
ERYTHROCYTE [SEDIMENTATION RATE] IN BLOOD: 7 MM/HR (ref 0–30)

## 2019-06-07 PROCEDURE — 99213 OFFICE O/P EST LOW 20 MIN: CPT | Performed by: NURSE PRACTITIONER

## 2019-06-07 NOTE — PROGRESS NOTES
Subjective    Subjective   Lisseth Cota is a 58 y.o. female.     Chief Complaint   Patient presents with   • lab work review     She presents today for follow up of joint pain in her hips, knees, and feet with stiffness all day long. She denies changes since yesterday. She states she takes tylenol and ibuprofen at night to deal with the pain. She had labs and she is curious about the results.       (6-6-19)She presents for follow up of tick bite about 6 months ago. She states it was attached for 4 days. She states it had a red knot and sort of a [place] rash. She states she didn't want to take medicine for it because she is very healthy. She states she hurts all over. She states it feels like joint pain. She states she exercises 4 times a week on an elliptical and does light squats. She states she has to take 5 steps to get loose when she stands up. She states she feels like she is 89 years old at times. She c/o feet pain. She describes the pain as dull. She states sitting makes it worse. She states she can't get comfortable at times. She denies family history of arthritis. She states she has never had nose bleeds but she has started having nose bleeds from her left nostril since fall. She states it pools in her hand. She states it happens when she least expects it. She states it occurs all throughout the day. She states it is worse in her hips, knees, and feet.  Her mom had a history of sjogrens and SLE.          The following portions of the patient's history were reviewed and updated as appropriate: allergies, current medications, past family history, past medical history, past social history, past surgical history and problem list.    Review of Systems   Constitutional: Negative for fatigue, fever and unexpected weight change.   HENT: Positive for nosebleeds. Negative for ear pain, rhinorrhea and sore throat.    Eyes: Negative for visual disturbance.   Respiratory: Negative for cough, chest tightness and  "shortness of breath.    Cardiovascular: Negative for chest pain, palpitations and leg swelling.   Gastrointestinal: Negative for abdominal pain, blood in stool, constipation, diarrhea, nausea and vomiting.   Endocrine: Negative for cold intolerance and heat intolerance.   Genitourinary: Negative for dysuria and hematuria.   Musculoskeletal: Positive for arthralgias. Negative for joint swelling and myalgias.   Skin: Negative for color change and rash.   Allergic/Immunologic: Negative for environmental allergies.   Neurological: Negative for dizziness, seizures, syncope and headaches.   Hematological: Negative for adenopathy.   Psychiatric/Behavioral: Negative for suicidal ideas. The patient is not nervous/anxious.        Objective     /74 (BP Location: Right arm, Patient Position: Sitting, Cuff Size: Adult)   Pulse 107   Temp 97.4 °F (36.3 °C) (Oral)   Ht 165.1 cm (65\")   Wt 82.3 kg (181 lb 8 oz)   SpO2 98%   BMI 30.20 kg/m²     Physical Exam   Constitutional: She is oriented to person, place, and time. She appears well-developed and well-nourished. No distress.   HENT:   Head: Normocephalic and atraumatic.   Cardiovascular: Normal rate, regular rhythm, S1 normal, S2 normal, normal heart sounds, intact distal pulses and normal pulses. Exam reveals no gallop and no friction rub.   No murmur heard.  Pulmonary/Chest: Effort normal and breath sounds normal. No respiratory distress.   Abdominal: Soft. Bowel sounds are normal. She exhibits no distension. There is no tenderness.   Neurological: She is alert and oriented to person, place, and time.   Skin: Skin is warm and dry. She is not diaphoretic.   Psychiatric: She has a normal mood and affect. Her behavior is normal. Judgment and thought content normal.   Vitals reviewed.      Assessment/Plan     Problem List Items Addressed This Visit     None      Visit Diagnoses     Elevated rheumatoid factor    -  Primary    Relevant Orders    Ambulatory Referral to " Rheumatology    Arthralgia, unspecified joint        Relevant Orders    Ambulatory Referral to Rheumatology          Plan: I have explained to her that her lyme test was negative, AUGUSTO was negative. Rheumatoid factor is slightly elevated at 14.8. Refer to rheumatology for further testing as she does have some joint pain and tenderness though not typical for rheumatoid arthritis. I have explained that her liver enzymes are slightly elevated. Will monitor for changes in the future. I have explained that her cholesterol is a little high. She states she will work on her diet to improve her cholesterol.       Patient's Body mass index is 30.2 kg/m². BMI is above normal parameters. Recommendations include: educational material.   (Normal BMI:  18.5-24.9, OW 25-29.9, Obesity 30 or greater)        Understands disease processes and need for medications.  Understands reasons for urgent and emergent care.  Patient (& family) verbalized agreement for treatment plan.   Emotional support and active listening provided.  Patient provided time to verbalize feelings.               This document has been electronically signed by ABBEI Schwartz   June 7, 2019 3:47 PM

## 2019-06-07 NOTE — PATIENT INSTRUCTIONS

## 2019-06-07 NOTE — PROGRESS NOTES
Let her know her labs have resulted. Her lyme test is negative. Her AUGUSTO that looks for lupus is negative. Her rheumatoid factor is elevated and her liver enzymes are elevated. Would she like to come for a visit to discuss these?

## 2019-11-19 ENCOUNTER — OFFICE VISIT (OUTPATIENT)
Dept: FAMILY MEDICINE CLINIC | Facility: CLINIC | Age: 59
End: 2019-11-19

## 2019-11-19 VITALS
OXYGEN SATURATION: 99 % | SYSTOLIC BLOOD PRESSURE: 124 MMHG | WEIGHT: 175 LBS | HEART RATE: 83 BPM | DIASTOLIC BLOOD PRESSURE: 68 MMHG | HEIGHT: 65 IN | TEMPERATURE: 97.7 F | BODY MASS INDEX: 29.16 KG/M2

## 2019-11-19 DIAGNOSIS — G47.30 SLEEP APNEA, UNSPECIFIED TYPE: Primary | ICD-10-CM

## 2019-11-19 PROCEDURE — 99212 OFFICE O/P EST SF 10 MIN: CPT | Performed by: NURSE PRACTITIONER

## 2019-11-19 NOTE — PROGRESS NOTES
"Subjective   Lisseth Cota is a 59 y.o. female.     Chief Complaint   Patient presents with   • Sleeping Problem       She presents with c/o difficulty sleeping for the past 3-4 years. She states her  has been waking her up at night telling her that she is not breathing. She states she snores a little but not a lot. She states she always sleeps in the car. She states she falls asleep watching tv. She states she took a quiz online and scored 11, it says if you score 10, you need a sleep study. She c/o feeling tired throughout the day.          The following portions of the patient's history were reviewed and updated as appropriate: allergies, current medications, past family history, past medical history, past social history, past surgical history and problem list.    Review of Systems   Constitutional: Positive for fatigue. Negative for fever and unexpected weight change.   HENT: Negative for ear pain, rhinorrhea and sore throat.    Eyes: Negative for visual disturbance.   Respiratory: Negative for cough, chest tightness and shortness of breath.    Cardiovascular: Negative for chest pain, palpitations and leg swelling.   Gastrointestinal: Negative for abdominal pain, blood in stool, constipation, diarrhea, nausea and vomiting.   Endocrine: Negative for cold intolerance and heat intolerance.   Genitourinary: Negative for dysuria and hematuria.   Musculoskeletal: Negative for arthralgias and myalgias.   Skin: Negative for color change.   Allergic/Immunologic: Negative for environmental allergies.   Hematological: Negative for adenopathy.   Psychiatric/Behavioral: Negative for suicidal ideas. The patient is not nervous/anxious.        Objective     /68 (BP Location: Right arm, Patient Position: Sitting, Cuff Size: Adult)   Pulse 83   Temp 97.7 °F (36.5 °C) (Oral)   Ht 165.1 cm (65\")   Wt 79.4 kg (175 lb)   SpO2 99%   BMI 29.12 kg/m²     Physical Exam   Constitutional: She is oriented to person, place, " and time. Vital signs are normal. She appears well-developed and well-nourished. No distress.   HENT:   Head: Normocephalic and atraumatic.   Cardiovascular: Normal rate, regular rhythm, S1 normal, S2 normal, normal heart sounds and intact distal pulses. Exam reveals no gallop and no friction rub.   No murmur heard.  Pulmonary/Chest: Effort normal and breath sounds normal. No respiratory distress.   Abdominal: Normal appearance. She exhibits no distension.   Musculoskeletal: She exhibits no edema.   Neurological: She is alert and oriented to person, place, and time.   Skin: Skin is warm and dry. She is not diaphoretic.   Psychiatric: She has a normal mood and affect. Her behavior is normal. Judgment and thought content normal.       Assessment/Plan     Problem List Items Addressed This Visit     None      Visit Diagnoses     Sleep apnea, unspecified type    -  Primary    Relevant Orders    Ambulatory Referral to Sleep Medicine (Completed)          Plan: Refer for sleep study. Follow up as needed.     Patient's Body mass index is 29.12 kg/m². BMI is above normal parameters. Recommendations include: educational material.   (Normal BMI:  18.5-24.9, OW 25-29.9, Obesity 30 or greater)        Understands disease processes and need for medications.  Understands reasons for urgent and emergent care.  Patient (& family) verbalized agreement for treatment plan.   Emotional support and active listening provided.  Patient provided time to verbalize feelings.               This document has been electronically signed by ABBIE Schwartz   November 19, 2019 9:56 AM

## 2019-11-19 NOTE — PATIENT INSTRUCTIONS

## 2019-12-11 ENCOUNTER — OFFICE VISIT (OUTPATIENT)
Dept: OBSTETRICS AND GYNECOLOGY | Facility: CLINIC | Age: 59
End: 2019-12-11

## 2019-12-11 VITALS
WEIGHT: 181.2 LBS | BODY MASS INDEX: 30.19 KG/M2 | SYSTOLIC BLOOD PRESSURE: 118 MMHG | DIASTOLIC BLOOD PRESSURE: 62 MMHG | HEIGHT: 65 IN

## 2019-12-11 DIAGNOSIS — N95.2 ATROPHIC VAGINITIS: ICD-10-CM

## 2019-12-11 DIAGNOSIS — Z01.419 ENCNTR FOR GYN EXAM (GENERAL) (ROUTINE) W/O ABN FINDINGS: Primary | ICD-10-CM

## 2019-12-11 PROCEDURE — 99396 PREV VISIT EST AGE 40-64: CPT | Performed by: OBSTETRICS & GYNECOLOGY

## 2019-12-11 NOTE — PROGRESS NOTES
"Subjective   Chief Complaint   Patient presents with   • Gynecologic Exam     annual;      Lisseth Cota is a 59 y.o. year old  who is post-menopausal.  She is S/P hysterectomy presenting to be seen for her annual exam.  This past year she has not been on hormone replacement therapy.  There has not been vaginal bleeding in the last 12 months.  Menopausal symptoms are present (vaginal dryness) and are significantly affecting her quality of life.    SEXUAL Hx:  She is currently sexually active.  In the past year there there has been NO new sexual partners.    Condoms are never used.  She would not like to be screened for STD's at today's exam.  [place] is painful: yes - but has not tried OTC lubricants   HEALTH Hx:  She exercises regularly: yes.  She wears her seat belt: yes.  She has concerns about domestic violence: no.  OTHER THINGS SHE WANTS TO DISCUSS TODAY:  Nothing else    The following portions of the patient's history were reviewed and updated as appropriate:problem list, current medications, allergies, past family history, past medical history, past social history and past surgical history.    Social History    Tobacco Use      Smoking status: Never Smoker      Smokeless tobacco: Never Used    Review of Systems  Constitutional POS: nothing reported    NEG: anorexia or night sweats   Genitourinary POS: nothing reported    NEG: dysuria or hematuria   Gastointestinal POS: nothing reported    NEG: bloating, change in bowel habits, melena or reflux symptoms   Integument POS: nothing reported    NEG: moles that are changing in size, shape, color or rashes   Breast POS: nothing reported    NEG: persistent breast lump, skin dimpling or nipple discharge        Objective   /62   Ht 165.1 cm (65\")   Wt 82.2 kg (181 lb 3.2 oz)   Breastfeeding No   BMI 30.15 kg/m²     General:  well developed; well nourished  no acute distress   Skin:  No suspicious lesions seen   Thyroid: normal to inspection and " palpation   Breasts:  Examined in supine position  Symmetric without masses or skin dimpling  Nipples normal without inversion, lesions or discharge  There are no palpable axillary nodes   Abdomen: soft, non-tender; no masses  no umbilical or inguinal hernias are present  no hepato-splenomegaly   Pelvis: Clinical staff was present for exam  External genitalia:  normal appearance of the external genitalia including [place]th[place]'s and [place]'s glands.  :  urethral meatus normal;  Vaginal:  atrophic mucosal changes are present;  Cervix:  absent.  Uterus:  absent.  Adnexa:  non palpable bilaterally.        Assessment   1. Normal GYN exam in postmenopausal female s/p HYST  2. Vaginal Dryness and Dyspareunia associated with Atrophic Vaginitis  3. She is up to date on all relevant gynecologic and colorectal screenings     Plan   Pap was not done today.  I explained to Lisseth that the Pap smears are no longer recommended in patient's after hysterectomy.   I stressed to Lisseth that she still should be seen to be seen yearly for a full physical including breast and pelvic exam.  She was encouraged to get yearly mammograms.  She should report any palpable breast lump(s) or skin changes regardless of mammographic findings.  I explained to Lisseth that notification regarding her mammogram results will come from the center performing the study.  Our office will not be routinely calling with mammogram results.  It is her responsibility to make sure that the results from the mammogram are communicated to her by the breast center.  If she has any questions about the results, she is welcome to call our office anytime.  1. Will start on Premarin for atrophic vaginitis.  2. The importance of keeping all planned follow-up and taking all medications as prescribed was emphasized.  3. Follow up for annual exam in 1 year    New Medications Ordered This Visit   Medications   • conjugated estrogens (PREMARIN) 0.625 MG/GM vaginal cream     Sig: Use  0.5 - 2.0 grams intravaginally 1-3 times per week to control symptoms.     Dispense:  1 each     Refill:  12          This note was electronically signed.    Lisa Wen, DO  December 11, 2019    Note: Speech recognition transcription software may have been used to create portions of this document.  An attempt at proofreading has been made but errors in transcription could still be present.

## 2020-08-04 ENCOUNTER — OFFICE VISIT (OUTPATIENT)
Dept: PULMONOLOGY | Facility: CLINIC | Age: 60
End: 2020-08-04

## 2020-08-04 VITALS
BODY MASS INDEX: 29.16 KG/M2 | HEIGHT: 65 IN | OXYGEN SATURATION: 98 % | HEART RATE: 69 BPM | WEIGHT: 175 LBS | TEMPERATURE: 97.3 F | DIASTOLIC BLOOD PRESSURE: 68 MMHG | SYSTOLIC BLOOD PRESSURE: 115 MMHG

## 2020-08-04 DIAGNOSIS — G47.33 OSA (OBSTRUCTIVE SLEEP APNEA): Primary | ICD-10-CM

## 2020-08-04 PROCEDURE — 99213 OFFICE O/P EST LOW 20 MIN: CPT | Performed by: NURSE PRACTITIONER

## 2020-08-04 RX ORDER — MELATONIN 10 MG
CAPSULE ORAL
COMMUNITY
End: 2022-10-24

## 2020-08-04 NOTE — PROGRESS NOTES
Do you smoke? no      Lung Function Test? no  Chest X-Ray? no    CT Chest? no Allergy Test? no    Family hx of Lung disease or Lung Cancer?no    If FHx is posivitive for lung cancer, what is the relationship of the family member? NA    Shortness of breath? no    How far can you walk without getting short of breath? None    Any coughing? no    Any wheezing? no    Acid Reflux? no    Do you snore? yes    Daytime Fatigue? no    Occupation? [email or web address]    Have you been exposed to any chemicals at your job? no    What inhalers are you currently using? None    Have you had the Influenza Vaccine? yes    Would you like to receive this Vaccine today? no    Have you had the Pneumonia Vaccine?  no  Would you like to receive this Vaccine today? no      Subjective    Lisseth Cota presents for the following Sleep Apnea      History of Present Illness     Ms. Cota is a 60 year old female with no significant medical history.    She presents today as a new patient for evaluation of sleep apnea.  She tells me that her daughter tells her that she snores at night time and that her  has told her that she has occasional episodes of apnea.    omorbid Conditions/contraindications to home sleep study:   CVA within the last 30 days:  no             History of stroke or myocardial infarction:   no            Transient ischemic attack:  no             Coronary artery disease:  no             Sustained supraventricular tachycardic arrhythmias:  no             Sustained supraventricular bradycardic arrhythmias:  no             Idiopathic pulmonary hypertension:  no             Suspected nocturnal seizures:  no             Suspected narcolepsy:  no             Central sleep apnea:  no             Moderate or severe chronic obstructive pulmonary disease:  no             Severe congestive heart failure:  no             Oxygen dependent for any recent:  no             Cognitive impairment (unable to follow simple instructions):   no             Neuromuscular impairment; needs assistance for activities of daily living:  no             Sustained ventricular tachycardia:  no           [place] sleepiness scale    Use the following scale to choose most appropriate number for each situation:    0 = never dose or sleep  1 = slight chance of dozing or sleeping  2 = moderate chance of dozing or sleeping  3 = high chance of dozing or sleeping       Situation chance of dozing or sleeping    *Sitting and readin    *Watching TV:1    *Sitting inactive in a public place:0    *Being a passenger in a car for an hour without a break:3    *Lying down to rest in the afternoon:0    *Sitting and talking to someone:0      *Sitting quietly after lunch (without alcohol):3    *Sitting for a few minutes in traffic while drivin       Total score: 9    (0-9 is average score)      Has this patient had a previous sleep study? no     Is the patient on current Pap therapy?  no     Has the patient had observed apnea during sleep?  yes     Do experience excessive daytime sleepiness evidenced by:     Inappropriate daytime napping (e.g. during conversation, driving or eating):  no      Sleepiness that interferes with daily activity:  no       Do experience habitual snoring, gasping/choking episodes associated with awakenings?  yes     Do you have unexplained hypertension?  no     You have any soft tissue abnormalities or neuromuscular diseases involving the craniofacial area or upper airway?  no     Obesity:         Patient's Body mass index is 29.12 kg/m². BMI is above normal parameters. Recommendations include: exercise counseling and nutrition counseling.      Other signs and symptoms:     Nonrestorative sleep: no    Irritability:  no    Night shift work:  no    High blood pressure:  no    Memory loss:  no    Disturbed restless sleep: no    Frequent/unexplained nocturia:   yes     She voices no major comlaints at today's visit. She does tell me that some days she  experiences daytime sleepiness.  She also reports that she usually feels un rested in the mornings and experiences non restorative sleep..  She is a life long non smoker.      Review of Systems   Constitutional: Negative for activity change, appetite change, chills, fatigue and unexpected weight change.   HENT: Negative for congestion, postnasal drip and rhinorrhea.    Respiratory: Negative for apnea, cough, chest tightness, shortness of breath and wheezing.    Cardiovascular: Negative for chest pain, palpitations and leg swelling.   Gastrointestinal: Negative for nausea.   Musculoskeletal: Negative for gait problem.   Skin: Negative for pallor.   Allergic/Immunologic: Negative for environmental allergies.   Neurological: Negative for syncope.   Psychiatric/Behavioral: Negative for confusion. The patient is not nervous/anxious.        Active Problems:  Problem List Items Addressed This Visit     None      Visit Diagnoses     ANIKET (obstructive sleep apnea)    -  Primary          Past Medical History:  Past Medical History:   Diagnosis Date   • Endometriosis    • Fibrocystic breast    • Menopausal symptoms    • S/P total abdominal hysterectomy     ovaries remain       Family History:  Family History   Problem Relation Age of Onset   • Heart disease Mother         by-pass   • Heart disease Father    • Neuropathy Father    • Hypertension Father    • Breast cancer Neg Hx    • Uterine cancer Neg Hx    • Endometrial cancer Neg Hx    • Ovarian cancer Neg Hx    • Colon cancer Neg Hx        Social History:  Social History     Tobacco Use   • Smoking status: Never Smoker   • Smokeless tobacco: Never Used   Substance Use Topics   • Alcohol use: Yes     Frequency: Monthly or less       Current Medications:  Current Outpatient Medications   Medication Sig Dispense Refill   • ASPIRIN ADULT LOW DOSE 81 MG EC tablet      • conjugated estrogens (PREMARIN) 0.625 MG/GM vaginal cream Use 0.5 - 2.0 grams intravaginally 1-3 times per week  "to control symptoms. 1 each 12   • Melatonin 10 MG capsule Take  by mouth.     • Multiple Vitamin (MULTI-VITAMIN DAILY PO) Take  by mouth 2 (two) times a day.       No current facility-administered medications for this visit.        Allergies:  Allergies   Allergen Reactions   • Azithromycin Itching   • Penicillins Rash   • Sulfa Antibiotics Nausea Only       Vitals:  /68   Pulse 69   Temp 97.3 °F (36.3 °C) (Temporal)   Ht 165.1 cm (65\")   Wt 79.4 kg (175 lb)   SpO2 98%   BMI 29.12 kg/m²     Imaging:    Imaging Results (Most Recent)     None          Pulmonary Functions Testing Results:    No results found for: FEV1, FVC, KKO9MWN, TLC, DLCO    Results for orders placed or performed in visit on 06/06/19   Comprehensive Metabolic Panel   Result Value Ref Range    Glucose 90 65 - 99 mg/dL    BUN 11 6 - 20 mg/dL    Creatinine 0.88 0.57 - 1.00 mg/dL    Sodium 142 136 - 145 mmol/L    Potassium 5.0 3.5 - 5.2 mmol/L    Chloride 105 98 - 107 mmol/L    CO2 25.0 22.0 - 29.0 mmol/L    Calcium 9.8 8.6 - 10.5 mg/dL    Total Protein 7.7 6.0 - 8.5 g/dL    Albumin 4.70 3.50 - 5.20 g/dL    ALT (SGPT) 39 (H) 1 - 33 U/L    AST (SGOT) 33 (H) 1 - 32 U/L    Alkaline Phosphatase 75 39 - 117 U/L    Total Bilirubin 0.3 0.2 - 1.2 mg/dL    eGFR Non African Amer 66 >60 mL/min/1.73    Globulin 3.0 gm/dL    A/G Ratio 1.6 g/dL    BUN/Creatinine Ratio 12.5 7.0 - 25.0    Anion Gap 12.0 mmol/L   Lipid Panel   Result Value Ref Range    Total Cholesterol 180 0 - 200 mg/dL    Triglycerides 63 0 - 150 mg/dL    HDL Cholesterol 53 40 - 60 mg/dL    LDL Cholesterol  114 (H) 0 - 100 mg/dL    VLDL Cholesterol 12.6 5 - 40 mg/dL    LDL/HDL Ratio 2.16    [place] With / DsDNA, RNP, Sjogrens A / B, Guardado   Result Value Ref Range    [place] Direct Negative Negative   C-reactive Protein   Result Value Ref Range    C-Reactive Protein 0.05 0.00 - 0.50 mg/dL   Cyclic Citrul Peptide Antibody, IgG / IgA   Result Value Ref Range    CCP Antibodies IgG/IgA 2 0 - 19 units "   Rheumatoid Factor   Result Value Ref Range    Rheumatoid Factor Quantitative 14.8 (H) 0.0 - 14.0 IU/mL   Sedimentation Rate   Result Value Ref Range    Sed Rate 7 0 - 30 mm/hr   Lyme, Total Antibody Test / Reflex   Result Value Ref Range    Lyme Ab IgG/IgM <0.91 0.00 - 0.90 ISR   CBC Auto Differential   Result Value Ref Range    WBC 5.63 3.40 - 10.80 10*3/mm3    RBC 4.68 3.77 - 5.28 10*6/mm3    Hemoglobin 14.3 12.0 - 15.9 g/dL    Hematocrit 45.3 34.0 - 46.6 %    MCV 96.8 79.0 - 97.0 fL    MCH 30.6 26.6 - 33.0 pg    MCHC 31.6 31.5 - 35.7 g/dL    RDW 12.6 12.3 - 15.4 %    RDW-SD 44.8 37.0 - 54.0 fl    MPV 11.5 6.0 - 12.0 fL    Platelets 282 140 - 450 10*3/mm3    Neutrophil % 53.0 42.7 - 76.0 %    Lymphocyte % 36.6 19.6 - 45.3 %    Monocyte % 7.6 5.0 - 12.0 %    Eosinophil % 2.1 0.3 - 6.2 %    Basophil % 0.7 0.0 - 1.5 %    Immature Grans % 0.0 0.0 - 0.5 %    Neutrophils, Absolute 2.98 1.70 - 7.00 10*3/mm3    Lymphocytes, Absolute 2.06 0.70 - 3.10 10*3/mm3    Monocytes, Absolute 0.43 0.10 - 0.90 10*3/mm3    Eosinophils, Absolute 0.12 0.00 - 0.40 10*3/mm3    Basophils, Absolute 0.04 0.00 - 0.20 10*3/mm3    Immature Grans, Absolute 0.00 0.00 - 0.05 10*3/mm3    nRBC 0.0 0.0 - 0.2 /100 WBC       Objective   Physical Exam     GENERAL APPEARANCE: Well developed, well nourished, alert and cooperative, and appears to be in no acute distress.    HEAD: normocephalic. Atraumatic.    EYES: PERRL, EOMI. Vision is grossly intact.    THROAT: Oral cavity and pharynx normal. No inflammation, swelling, exudate, or lesions.     NECK: Neck supple.  No thyromegaly.    CARDIAC: Normal S1 and S2. No S3, S4 or murmurs. Rhythm is regular.     RESPIRATORY:Bilateral air entry positive. Bilateral diminished breath sounds. No wheezing, crackles or rhonchi noted.    GI: Positive bowel sounds. Soft, nondistended, nontender.     MUSCULOSKELETAL: No significant deformity or joint abnormality. No edema. Peripheral pulses intact. No  varicosities.    NEUROLOGICAL: Strength and sensation symmetric and intact throughout.     PSYCHIATRIC: The mental examination revealed the patient was oriented to person, place, and time.       Assessment/Plan      Obstructive Sleep Apnea:  -Will order a home sleep study to rule out sleep apnea.  - Patient was educated on positive airway pressure treatment.  As per CMS guidelines, more than 4 hours on 70% of observed nights is considered adherence. Patient was strongly encouraged to use CPAP as much as possible during sleep as more CPAP use is equal to more benefit. Use of heated humidification in positive airway pressure treatment to improve the adherence to the device.  In case of claustrophobia, we will provide the patient cognitive behavioral therapy and desensitization. Oral appliances use will be discussed with the patient in case of mild to moderate sleep apnea or if the patient with severe disease fail positive airway pressure treatment.       The patient was extensively educated on the consequences of untreated obstructive sleep apnea namely cardiovascular/metabolic disorder, neurocognitive deficit, daytime sleepiness, motor vehicle accidents, depression, mood disorders and reduced quality of life.  At the end of conversation, the patient voices understanding of the disease process and treatment modality.  Patient also understands the risk of untreated obstructive sleep apnea and benefit benefits of the treatment.    Counseling time was greater than 10 minutes.          ICD-10-CM ICD-9-CM   1. ANIKET (obstructive sleep apnea) G47.33 327.23       Return in about 2 months (around 10/4/2020).

## 2020-09-16 DIAGNOSIS — G47.33 OSA (OBSTRUCTIVE SLEEP APNEA): Primary | ICD-10-CM

## 2020-09-16 NOTE — PROGRESS NOTES
Sleep study was reviewed.  Mild sleep apnea was noted.   Will start her on an autopap with settings of 6-14.

## 2020-10-02 ENCOUNTER — TELEPHONE (OUTPATIENT)
Dept: FAMILY MEDICINE CLINIC | Facility: CLINIC | Age: 60
End: 2020-10-02

## 2020-10-02 NOTE — TELEPHONE ENCOUNTER
Patient requested a call back. Patient was informed by [facility] in [place] that a copy of her office visit notes are needed in order to get a prior authorization for a CPAP machine. Patient would like to know if her records dealing with her sleep apnea could be sent to [facility].    [facility]'s fax number: [phone]    Please call and advise. Patient call back [phone]

## 2020-10-07 NOTE — TELEPHONE ENCOUNTER
Spoke with Lisseth and asked her to get them to send a request via fax and we would send over Gemma smith

## 2020-11-24 ENCOUNTER — HOSPITAL ENCOUNTER (OUTPATIENT)
Dept: MAMMOGRAPHY | Facility: HOSPITAL | Age: 60
Discharge: HOME | End: 2020-11-24
Admitting: FAMILY MEDICINE

## 2020-11-24 DIAGNOSIS — Z12.31 VISIT FOR SCREENING MAMMOGRAM: ICD-10-CM

## 2020-11-24 PROCEDURE — 77067 SCR MAMMO BI INCL CAD: CPT

## 2020-11-24 PROCEDURE — 77067 SCR MAMMO BI INCL CAD: CPT | Performed by: RADIOLOGY

## 2020-11-24 PROCEDURE — 77063 BREAST TOMOSYNTHESIS BI: CPT

## 2020-11-24 PROCEDURE — 77063 BREAST TOMOSYNTHESIS BI: CPT | Performed by: RADIOLOGY

## 2020-12-16 ENCOUNTER — OFFICE VISIT (OUTPATIENT)
Dept: OBSTETRICS AND GYNECOLOGY | Facility: CLINIC | Age: 60
End: 2020-12-16

## 2020-12-16 VITALS
SYSTOLIC BLOOD PRESSURE: 130 MMHG | HEIGHT: 65 IN | DIASTOLIC BLOOD PRESSURE: 76 MMHG | WEIGHT: 181.6 LBS | BODY MASS INDEX: 30.26 KG/M2

## 2020-12-16 DIAGNOSIS — Z01.419 ENCNTR FOR GYN EXAM (GENERAL) (ROUTINE) W/O ABN FINDINGS: Primary | ICD-10-CM

## 2020-12-16 PROCEDURE — 99396 PREV VISIT EST AGE 40-64: CPT | Performed by: OBSTETRICS & GYNECOLOGY

## 2020-12-16 RX ORDER — ESTRADIOL 1 MG/1
TABLET ORAL
COMMUNITY
End: 2020-12-16

## 2020-12-16 NOTE — PROGRESS NOTES
"Subjective   Chief Complaint   Patient presents with   • Gynecologic Exam     annual; no c/o     Lisseth Cota is a 60 y.o. year old  who is post-menopausal.  She is S/P hysterectomy presenting to be seen for her annual exam.  This past year she has been on hormone replacement therapy. She is using vaginal estrogen cream and is pleased with it. She does not need refills at this time.  There has not been vaginal bleeding in the last 12 months.  Menopausal symptoms are not present.    SEXUAL Hx:  She is currently sexually active.  In the past year there there has been NO new sexual partners.    Condoms are never used.  She would not like to be screened for STD's at today's exam.  [place] is painful: no  HEALTH Hx:  She exercises regularly: yes.  She wears her seat belt: yes.  She has concerns about domestic violence: no.  OTHER THINGS SHE WANTS TO DISCUSS TODAY:  Nothing else    The following portions of the patient's history were reviewed and updated as appropriate:problem list, current medications, allergies, past family history, past medical history, past social history and past surgical history.    Social History    Tobacco Use      Smoking status: Never Smoker      Smokeless tobacco: Never Used    Review of Systems  Constitutional POS: nothing reported    NEG: anorexia or night sweats   Genitourinary POS: nothing reported    NEG: dysuria or hematuria   Gastointestinal POS: nothing reported    NEG: bloating, change in bowel habits, melena or reflux symptoms   Integument POS: nothing reported    NEG: moles that are changing in size, shape, color or rashes   Breast POS: nothing reported    NEG: persistent breast lump, skin dimpling or nipple discharge        Objective   /76   Ht 165.1 cm (65\")   Wt 82.4 kg (181 lb 9.6 oz)   LMP  (LMP Unknown)   Breastfeeding No   BMI 30.22 kg/m²     General:  well developed; well nourished  no acute distress   Skin:  No suspicious lesions seen   Thyroid: normal " to inspection and palpation   Breasts:  Examined in supine position  Symmetric without masses or skin dimpling  Nipples normal without inversion, lesions or discharge  There are no palpable axillary nodes   Abdomen: soft, non-tender; no masses  no umbilical or inguinal hernias are present  no hepato-splenomegaly   Pelvis: Clinical staff was present for exam  External genitalia:  normal appearance of the external genitalia including Bartholin's and [place]'s glands.  :  urethral meatus normal;  Vaginal:  normal pink mucosa without prolapse or lesions.  Cervix:  absent.  Uterus:  absent.  Adnexa:  non palpable bilaterally.        Assessment   1. Normal GYN exam in postmenopausal female s/p HYST  2. She is up to date on all relevant gynecologic and colorectal screenings     Plan   Pap was not done today.  I explained to Lisseth that the Pap smears are no longer recommended in patient's after hysterectomy.   I stressed to Lisseth that she still should be seen to be seen yearly for a full physical including breast and pelvic exam.  She was encouraged to get yearly mammograms.  She should report any palpable breast lump(s) or skin changes regardless of mammographic findings.  I explained to Lisseth that notification regarding her mammogram results will come from the center performing the study.  Our office will not be routinely calling with mammogram results.  It is her responsibility to make sure that the results from the mammogram are communicated to her by the breast center.  If she has any questions about the results, she is welcome to call our office anytime.  1. The importance of keeping all planned follow-up and taking all medications as prescribed was emphasized.  2. Follow up for annual exam in 1 year    No orders of the defined types were placed in this encounter.         This note was electronically signed.    Lisa Wen DO  December 16, 2020    Note: Speech recognition transcription software may have been used to  create portions of this document.  An attempt at proofreading has been made but errors in transcription could still be present.

## 2021-02-11 ENCOUNTER — TELEPHONE (OUTPATIENT)
Dept: OBSTETRICS AND GYNECOLOGY | Facility: CLINIC | Age: 61
End: 2021-02-11

## 2021-02-11 RX ORDER — CONJUGATED ESTROGENS 0.62 MG/G
CREAM VAGINAL
Qty: 1 EACH | Refills: 12 | Status: SHIPPED | OUTPATIENT
Start: 2021-02-11 | End: 2021-02-15

## 2021-02-12 ENCOUNTER — PRIOR AUTHORIZATION (OUTPATIENT)
Dept: OBSTETRICS AND GYNECOLOGY | Facility: CLINIC | Age: 61
End: 2021-02-12

## 2021-02-15 RX ORDER — ESTRADIOL 0.1 MG/G
CREAM VAGINAL
Qty: 42.5 G | Refills: 12 | Status: SHIPPED | OUTPATIENT
Start: 2021-02-15 | End: 2021-12-20 | Stop reason: ALTCHOICE

## 2021-02-22 ENCOUNTER — IMMUNIZATION (OUTPATIENT)
Dept: VACCINE CLINIC | Facility: HOSPITAL | Age: 61
End: 2021-02-22

## 2021-02-22 PROCEDURE — 91300 HC SARSCOV02 VAC 30MCG/0.3ML IM: CPT | Performed by: INTERNAL MEDICINE

## 2021-02-22 PROCEDURE — 0001A: CPT | Performed by: INTERNAL MEDICINE

## 2021-03-15 ENCOUNTER — IMMUNIZATION (OUTPATIENT)
Dept: VACCINE CLINIC | Facility: HOSPITAL | Age: 61
End: 2021-03-15

## 2021-03-15 PROCEDURE — 91300 HC SARSCOV02 VAC 30MCG/0.3ML IM: CPT | Performed by: INTERNAL MEDICINE

## 2021-03-15 PROCEDURE — 0002A: CPT | Performed by: INTERNAL MEDICINE

## 2021-03-22 ENCOUNTER — OFFICE VISIT (OUTPATIENT)
Dept: FAMILY MEDICINE CLINIC | Facility: CLINIC | Age: 61
End: 2021-03-22

## 2021-03-22 VITALS — HEIGHT: 65 IN | DIASTOLIC BLOOD PRESSURE: 80 MMHG | SYSTOLIC BLOOD PRESSURE: 120 MMHG | BODY MASS INDEX: 30.22 KG/M2

## 2021-03-22 DIAGNOSIS — N39.0 URINARY TRACT INFECTION WITH HEMATURIA, SITE UNSPECIFIED: ICD-10-CM

## 2021-03-22 DIAGNOSIS — R31.9 URINARY TRACT INFECTION WITH HEMATURIA, SITE UNSPECIFIED: ICD-10-CM

## 2021-03-22 DIAGNOSIS — R30.0 DYSURIA: Primary | ICD-10-CM

## 2021-03-22 LAB
BILIRUB BLD-MCNC: NEGATIVE MG/DL
CLARITY, POC: ABNORMAL
COLOR UR: YELLOW
GLUCOSE UR STRIP-MCNC: NEGATIVE MG/DL
KETONES UR QL: NEGATIVE
LEUKOCYTE EST, POC: NEGATIVE
NITRITE UR-MCNC: NEGATIVE MG/ML
PH UR: 6 [PH] (ref 5–8)
PROT UR STRIP-MCNC: NEGATIVE MG/DL
RBC # UR STRIP: ABNORMAL /UL
SP GR UR: 1.02 (ref 1–1.03)
UROBILINOGEN UR QL: NORMAL

## 2021-03-22 PROCEDURE — 87086 URINE CULTURE/COLONY COUNT: CPT | Performed by: NURSE PRACTITIONER

## 2021-03-22 PROCEDURE — 87077 CULTURE AEROBIC IDENTIFY: CPT | Performed by: NURSE PRACTITIONER

## 2021-03-22 PROCEDURE — 87186 SC STD MICRODIL/AGAR DIL: CPT | Performed by: NURSE PRACTITIONER

## 2021-03-22 PROCEDURE — 99213 OFFICE O/P EST LOW 20 MIN: CPT | Performed by: NURSE PRACTITIONER

## 2021-03-22 PROCEDURE — 81003 URINALYSIS AUTO W/O SCOPE: CPT | Performed by: NURSE PRACTITIONER

## 2021-03-22 RX ORDER — NITROFURANTOIN 25; 75 MG/1; MG/1
100 CAPSULE ORAL 2 TIMES DAILY
Qty: 14 CAPSULE | Refills: 0 | Status: SHIPPED | OUTPATIENT
Start: 2021-03-22 | End: 2021-12-20

## 2021-03-22 RX ORDER — PHENAZOPYRIDINE HYDROCHLORIDE 200 MG/1
200 TABLET, FILM COATED ORAL 3 TIMES DAILY PRN
Qty: 6 TABLET | Refills: 0 | Status: SHIPPED | OUTPATIENT
Start: 2021-03-22 | End: 2021-12-20

## 2021-03-22 NOTE — PROGRESS NOTES
"Subjective   Lisseth Cota is a 60 y.o. female.     Chief Complaint   Patient presents with   • Urinary Tract Infection       She presents with c/o urinary pressure that started last night. She states when she voids, she immediately feels like she has to go again. She feels like she is having spasms in her bladder. She denies fever, chills, hematuria.        The following portions of the patient's history were reviewed and updated as appropriate: allergies, current medications, past family history, past medical history, past social history, past surgical history and problem list.    Review of Systems   Constitutional: Negative for fatigue, fever and unexpected weight change.   HENT: Negative for ear pain, rhinorrhea and sore throat.    Eyes: Negative for visual disturbance.   Respiratory: Negative for cough, chest tightness and shortness of breath.    Cardiovascular: Negative for chest pain, palpitations and leg swelling.   Gastrointestinal: Negative for abdominal pain, blood in stool, constipation, diarrhea, nausea and vomiting.   Endocrine: Negative for cold intolerance and heat intolerance.   Genitourinary: Positive for dysuria (pressure). Negative for hematuria.   Musculoskeletal: Negative for arthralgias and myalgias.   Skin: Negative for color change.   Allergic/Immunologic: Negative for environmental allergies.   Hematological: Negative for adenopathy.   Psychiatric/Behavioral: Negative for suicidal ideas. The patient is not nervous/anxious.        Objective     /80 (BP Location: Left arm, Patient Position: Sitting, Cuff Size: Adult)   Ht 165.1 cm (65\")   LMP  (LMP Unknown)   BMI 30.22 kg/m²     Physical Exam  Vitals reviewed.   Constitutional:       General: She is not in acute distress.     Appearance: Normal appearance. She is well-developed. She is not diaphoretic.   HENT:      Head: Normocephalic and atraumatic.   Cardiovascular:      Rate and Rhythm: Normal rate and regular rhythm.      Heart " sounds: Normal heart sounds, S1 normal and S2 normal. No murmur heard.   No friction rub. No gallop.    Pulmonary:      Effort: Pulmonary effort is normal. No respiratory distress.   Abdominal:      General: Bowel sounds are normal. There is no distension.      Palpations: Abdomen is soft.      Tenderness: There is no abdominal tenderness. There is no right CVA tenderness, left CVA tenderness or guarding.   Skin:     General: Skin is warm and dry.   Neurological:      Mental Status: She is alert and oriented to person, place, and time.   Psychiatric:         Behavior: Behavior normal.         Thought Content: Thought content normal.         Judgment: Judgment normal.         Assessment/Plan     Problem List Items Addressed This Visit     None      Visit Diagnoses     Dysuria    -  Primary    Relevant Orders    POC Urinalysis Dipstick, Automated (Completed)    Urine Culture - Urine, Urine, Clean Catch    Urinary tract infection with hematuria, site unspecified        Relevant Medications    nitrofurantoin, macrocrystal-monohydrate, (Macrobid) 100 MG capsule    phenazopyridine (Pyridium) 200 MG tablet    Other Relevant Orders    Urine Culture - Urine, Urine, Clean Catch        Plan: Urinalysis has 1+ blood. Send urine for culture. Start macrobid. Drink lots of water. Follow up in a few days if no improvement.    Patient's Body mass index is 30.22 kg/m². BMI is above normal parameters. Recommendations include: educational material.   (Normal BMI:  18.5-24.9, OW 25-29.9, Obesity 30 or greater)               Understands disease processes and need for medications.  Understands reasons for urgent and emergent care.  Patient (& family) verbalized agreement for treatment plan.   Emotional support and active listening provided.  Patient provided time to verbalize feelings.               This document has been electronically signed by ABBIE Schwartz   March 23, 2021 08:52 EDT

## 2021-03-23 NOTE — PATIENT INSTRUCTIONS

## 2021-03-24 LAB — BACTERIA SPEC AEROBE CULT: ABNORMAL

## 2021-12-20 ENCOUNTER — OFFICE VISIT (OUTPATIENT)
Dept: OBSTETRICS AND GYNECOLOGY | Facility: CLINIC | Age: 61
End: 2021-12-20

## 2021-12-20 VITALS
SYSTOLIC BLOOD PRESSURE: 110 MMHG | BODY MASS INDEX: 28.62 KG/M2 | RESPIRATION RATE: 16 BRPM | WEIGHT: 172 LBS | DIASTOLIC BLOOD PRESSURE: 60 MMHG

## 2021-12-20 DIAGNOSIS — Z01.419 ENCOUNTER FOR GYNECOLOGICAL EXAMINATION WITHOUT ABNORMAL FINDING: Primary | ICD-10-CM

## 2021-12-20 DIAGNOSIS — N95.2 VAGINAL ATROPHY: ICD-10-CM

## 2021-12-20 PROCEDURE — 99396 PREV VISIT EST AGE 40-64: CPT | Performed by: NURSE PRACTITIONER

## 2021-12-20 RX ORDER — CONJUGATED ESTROGENS 0.62 MG/G
CREAM VAGINAL
Qty: 30 G | Refills: 4 | Status: SHIPPED | OUTPATIENT
Start: 2021-12-20 | End: 2022-12-21 | Stop reason: SDUPTHER

## 2021-12-20 NOTE — PROGRESS NOTES
Annual Visit     Patient Name: Lisseth Cota  : 1960   MRN: [id number]   Care Team: Patient Care Team:  Collin Grant MD as PCP - General  Collin Grant MD as PCP - Family Medicine  Lisa Wen DO (Inactive) as Consulting Physician (Obstetrics and Gynecology)  Ximena Narayan APRN as Nurse Practitioner (Family Medicine)    Chief Complaint:    Chief Complaint   Patient presents with   • Annual Exam       HPI: Lisseth Cota is a 61 y.o. year old  presenting to be seen for her gynecologic exam.   S/p total hyst - ovaries remain     Has been using estrace vaginal cream this last yr d/t insurance coverage   But now premarin is covered with her insurance and she would like to switch back   Doesn't feel like the estrace cream has worked nearly as well     Mammogram 2020 birads 1   She needs to schedule one for this year - done at [facility] in [place]     Colonoscopy 2017       Subjective      /60   Resp 16   Wt 78 kg (172 lb)   LMP  (LMP Unknown)   Breastfeeding No   BMI 28.62 kg/m²     BMI reviewed: Body mass index is 28.62 kg/m².      Objective     Physical Exam    Neuro: alert and oriented to person, place and time   General:  alert; cooperative; well developed; well nourished   Skin:  No suspicious lesions seen   Thyroid: normal to inspection and palpation   Lungs:  breathing is unlabored  clear to auscultation bilaterally   Heart:  regular rate and rhythm, S1, S2 normal, no murmur, click, rub or gallop  normal apical impulse   Breasts:  Examined in supine position  Symmetric without masses or skin dimpling  Nipples normal without inversion, lesions or discharge  There are no palpable axillary nodes   Abdomen: soft, non-tender; no masses  no umbilical or inguinal hernias are present  no hepato-splenomegaly   Pelvis: Clinical staff was present for exam  External genitalia:  normal appearance of the external genitalia including Bartholin's and [place]'s  glands.  :  urethral meatus normal;  Vaginal:  normal pink mucosa without prolapse or lesions.  Cervix:  absent.  Uterus:  absent.  Adnexa:  non palpable bilaterally.  Rectal:  digital rectal exam not performed; anus visually normal appearing.         Assessment / Plan      Assessment  Problems Addressed This Visit    ICD-10-CM ICD-9-CM   1. Encounter for gynecological examination without abnormal finding  Z01.419 V72.31   2. Vaginal atrophy  N95.2 627.3       Plan    Discussed monthly SBEs and importance of annual imaging   She will call to schedule mammogram     Script for premarin vaginal cream 2x/wk use to pharmacy   copay card given today     AV 1 yr             Follow Up  Return in about 1 year (around 12/20/2022) for Annual physical.  Patient was given instructions and counseling regarding her condition or for health maintenance advice. Please see specific information pulled into the AVS if appropriate.     Hannah Ladd, APRDEBRA  December 20, 2021  10:53 EST

## 2021-12-28 ENCOUNTER — E-VISIT (OUTPATIENT)
Dept: FAMILY MEDICINE CLINIC | Facility: TELEHEALTH | Age: 61
End: 2021-12-28

## 2021-12-28 ENCOUNTER — TELEMEDICINE (OUTPATIENT)
Dept: FAMILY MEDICINE CLINIC | Facility: CLINIC | Age: 61
End: 2021-12-28

## 2021-12-28 DIAGNOSIS — J06.9 ACUTE URI: ICD-10-CM

## 2021-12-28 DIAGNOSIS — B00.1 FEVER BLISTER: Primary | ICD-10-CM

## 2021-12-28 PROCEDURE — 99213 OFFICE O/P EST LOW 20 MIN: CPT | Performed by: NURSE PRACTITIONER

## 2021-12-28 PROCEDURE — 0202U NFCT DS 22 TRGT SARS-COV-2: CPT | Performed by: NURSE PRACTITIONER

## 2021-12-28 RX ORDER — ACYCLOVIR 50 MG/G
1 OINTMENT TOPICAL
Qty: 15 G | Refills: 0 | Status: SHIPPED | OUTPATIENT
Start: 2021-12-28 | End: 2022-11-29 | Stop reason: SDUPTHER

## 2021-12-28 NOTE — PROGRESS NOTES
Subjective   Lisseth Cota is a 61 y.o. female.     Chief Complaint   Patient presents with   • Exposure To Known Illness       She presents via video visit with c/o scratchy throat the Friday before Christmas. She wasn't feeling well but it wasn't a big deal. She was stuffed up and couldn't taste or smell Tuesday. She did a rapid and it was negative. On Thursday she did a pcr that was negative. Two of her sisters tested positive and her  tested positive the past few days. She c/o cough, altered taste, headache. She has been using zovirax for sores around her nose.        The following portions of the patient's history were reviewed and updated as appropriate: allergies, current medications, past family history, past medical history, past social history, past surgical history and problem list.    Review of Systems   Constitutional: Negative for fatigue, fever and unexpected weight change.   HENT: Positive for congestion, postnasal drip, rhinorrhea and sore throat. Negative for ear pain.    Eyes: Negative for visual disturbance.   Respiratory: Positive for cough. Negative for chest tightness and shortness of breath.    Cardiovascular: Negative for chest pain, palpitations and leg swelling.   Gastrointestinal: Negative for abdominal pain, blood in stool, constipation, diarrhea, nausea and vomiting.   Endocrine: Negative for cold intolerance and heat intolerance.   Genitourinary: Negative for dysuria.   Musculoskeletal: Negative for arthralgias and myalgias.   Skin: Negative for color change.   Allergic/Immunologic: Negative for environmental allergies.   Hematological: Negative for adenopathy.   Psychiatric/Behavioral: Negative for suicidal ideas. The patient is not nervous/anxious.        Objective     LMP  (LMP Unknown)     Physical Exam  Constitutional:       General: She is not in acute distress.     Appearance: Normal appearance. She is ill-appearing.   Pulmonary:      Effort: Pulmonary effort is normal.    Neurological:      Mental Status: She is alert and oriented to person, place, and time.   Psychiatric:         Mood and Affect: Mood normal.         Behavior: Behavior normal.         Assessment/Plan     Problem List Items Addressed This Visit     None      Visit Diagnoses     Fever blister    -  Primary    Relevant Medications    acyclovir (Zovirax) 5 % ointment    Acute URI        Relevant Orders    Respiratory Panel PCR w/COVID-19(SARS-CoV-2) [facility] In-House, NP Swab in UTM/VTM, 3-4 HR TAT - Swab, Nasopharynx          Plan: Get covid swab. Self quarantine for now. Stay hydrated. Go to ER with shortness of breath. She states she probably does not want the monoclonal antibody infusion. Keep scheduled follow up and follow up as needed.     @There is no height or weight on file to calculate BMI.     The use of a video visit has been reviewed with the patient and verbal informed consent has been obtained.        Understands disease processes and need for medications.  Understands reasons for urgent and emergent care.  Patient (& family) verbalized agreement for treatment plan.   Emotional support and active listening provided.  Patient provided time to verbalize feelings.               This document has been electronically signed by ABBIE Schwartz   December 28, 2021 15:08 EST

## 2021-12-28 NOTE — E-VISIT ESCALATED
Patient escalated   Provider Cierra Robles chose to escalate patient to another level of care because: Desired treatments not available   Patient was sent the following message:   please schedule a video visit to have a COVID-19 test ordered, if you have already done this pleas disregard the message.   What to do now:    Please set up a video visit  .   You won't be charged for your eVisit. If you paid with a credit card, the charge will be reversed.   Chief Complaint: Coronavirus (COVID-19), cold, sinus pain, allergy, or flu   Patient introduction   Patient is 61-year-old female who reports cough, congestion, rhinitis, headache, chills, fatigue, and nausea or vomiting that started 3-5 days ago.   Coronavirus Disease 2019 (COVID-19) exposure, testing history, and vaccination status:    Close contact with a person with a confirmed case of COVID-19.    Exposure was 3 to 7 days ago.    No recent travel outside of their local community.    Patient had a viral test 7-14 days ago. Test result was negative.    Reports receiving 2 doses.    Received the Pfizer vaccine for the first dose.    Received the Pfizer vaccine for the second dose.    Received their most recent dose of the vaccine > 14 days ago.   Warning. The following may warrant further investigation:    Reports confirmed exposure to COVID-19   When asked why they're seeking care online today, patient reports they want to know if they have a cold or something more serious and want to get tested for COVID-19.   Patient did not request an excuse note.   General presentation   Symptoms came on gradually.   Fever:    Denies fever.   Sinus and nasal symptoms:    Reports rhinitis.    Reports white nasal drainage.    Reports congestion with sinus pain or pressure on or around their forehead and eyes.    Patient first noticed sinus pain 5-9 days ago.    Sinus pain is worse with Valsalva.    Denies itchy nose or sneezing.    Denies postnasal drip.    Denies history of  unhealed nasal septal ulcer/nasal wound.    Denies antibiotic treatment for sinus infection in the last year.    Denies history of deviated septum or nasal polyps.   Sore throat:    Denies sore throat.   Head and body aches:    Reports headache, described as moderate (4-6 on a scale of 1-10).    Reports chills.    Reports fatigue.    Denies sweats.    Denies myalgia.   Dizziness:    Reports mild dizziness that does not interfere with daily activities.   Cough:    Reports cough.    Cough is worse during the day.    Cough is not productive of sputum.   Wheezing and SOB:    Denies COPD diagnosis.    Denies asthma diagnosis.    Denies wheezing.    Denies shortness of breath.    Denies previous albuterol inhaler use during URIs, bronchitis, or pneumonia.    Denies previous steroid inhaler use during URIs, bronchitis, or pneumonia.   Chest pain:    Denies chest pain.   Allergies:    Denies history of allergies.   Flu exposure:    Denies recent exposure to confirmed flu diagnosis.    Denies receiving a flu vaccine this season.   Patient denies the following red flags:    Changes in alertness or awareness    Symptoms suggesting intracranial hemorrhage    Decreased urination   Pregnancy/menstrual status/breastfeeding:    Patient is postmenopausal   Self-exam:    No difficulty moving their chin toward their chest    Neck lymph nodes feel normal   Denies antibiotic treatment for similar symptoms within the past month.   Current medications   Reports taking over-the-counter medication for current symptoms. Patient has taken acetaminophen, dextromethorphan, guaifenesin/dextromethorphan, and oxymetazoline.   Denies taking other medications or supplements.   Medication allergies   None.   Medication contraindication review   Denies history of anaphylactic reaction to beta-lactam antibiotics; aspirin triad; blood dyscrasia; bone marrow depression; catecholamine-releasing paraganglioma; coronary artery disease; coagulation disorder;  congenital long QT syndrome; depression; electrolyte abnormalities; fungal infection; GI bleeding; GI obstruction; G6PD deficiency; heart arrhythmia; hypertension; kidney disease or hemodialysis; mononucleosis; myasthenia; recent myocardial infarction; NSAID-induced asthma/urticaria; Parkinson's disease; pheochromocytoma; porphyria; Reye syndrome; seizure disorder; ulcerative colitis; and urinary retention.   Denies history of metoclopramide-associated dystonic reaction and tardive dyskinesia.   No known history of amoxicillin-clavulanate-associated cholestatic jaundice or hepatic impairment.   No known history of azithromycin-associated cholestatic jaundice or hepatic impairment.   Past medical history   Immune conditions: Denies immunocompromising conditions. Denies history of cancer.   Social history   Non-smoker.   Assessment:   Patient determined to need a level of care not appropriate to be delivered through eVisit.   Plan:   Patient informed of need to seek in-person care      ----------   Electronically signed by ABBIE Romero on 2021-12-28 at 15:23PM   ----------   Patient Interview Transcript:   Why are you getting care through eVisit today? We can't guarantee a specific treatment or test. Your provider will decide what's best for you. Select all that apply.    I want to know if I have a cold or something more serious    I want to be tested for COVID-19   Not selected:    I want a specific treatment or medication    I want to know if I need to be seen by a provider    I need a doctor's note    I want to get the COVID-19 vaccine    I think I'm having side effects from the COVID-19 vaccine    I just want to feel better!    None of the above   Do any of these statements apply to you? Select all that apply.    None of the above   Not selected:    The public health department directed me to get a COVID-19 test    My employer directed me to get a COVID-19 test    I need to get a COVID-19 test BEFORE I  travel    I need to get a COVID-19 test AFTER traveling in the last week   Which of these symptoms are bothering you? Select all that apply.    Cough    Stuffed-up nose or sinuses    Runny nose    Headache    Chills    Fatigue or tiredness    Nausea or vomiting   Not selected:    Shortness of breath    Fever    Itchy or watery eyes    Itchy nose or sneezing    Loss of smell or taste    Sore throat    Hoarse voice or loss of voice    Sweats    Muscle or body aches    Diarrhea    I don't have any of these symptoms   Before we learn more about why you're here, we'll get some information related to COVID-19. We'll ask about risk factors, testing, vaccination status, and exposure. Do you have any of these conditions? If so, you may be at increased risk for complications from COVID-19. Select all that apply.    None of the above   Not selected:    Chronic lung disease, such as cystic fibrosis or interstitial fibrosis    Heart disease, such as congenital heart disease, congestive heart failure, or coronary artery disease    Disorder of the brain, spinal cord, or nerves and muscles, such as dementia, cerebral palsy, epilepsy, muscular dystrophy, or developmental delay    Metabolic disorder or mitochondrial disease    Cerebrovascular disease, such as stroke or another condition affecting the blood vessels or blood supply to the brain   Do you live in a group care setting? Examples include: - Nursing home - Residential care - Psychiatric treatment facility - Group home - [facility] - Board and care home - Homeless shelter - Foster care setting Select one.    No   Not selected:    Yes   Have you ever been tested for COVID-19? Select one.    Yes   Not selected:    No   When was your most recent COVID-19 test? Select one.    7 to 14 days ago   Not selected:    Within the last week    15 to 30 days ago    1 to 3 months ago    More than 3 months ago   What type of COVID-19 test did you have? There are 2 types of COVID-19 tests: -  "Viral tests check if you're currently infected with COVID-19. - Antibody tests check if you've been infected in the past. Select one.    Viral test for current infection   Not selected:    Antibody test for past infection   What was the result of your most recent COVID-19 test? Select one.    Negative (no sign of infection)   Not selected:    Positive (signs of current or past infection)    I'm not sure   Have you gotten the COVID-19 vaccine? Select one.    Yes   Not selected:    No   How many doses of the COVID-19 vaccine have you gotten? This includes boosters. Select one.    2 doses   Not selected:    1 dose    3 doses   Which COVID-19 vaccine did you get for your first dose? Check your Vaccination Record Card under Product Name/. Select one.    Pf[employer]Art (Pfizer)   Not selected:    Doug & Doug's Shira Vaccine (J&J/Shira)    Moderna   Which COVID-19 vaccine did you get for your second dose? Check your Vaccination Record Card under Product Name/. Select one.    Pfizer-BioNTHireArt (Pfizer)   Not selected:    Doug & Doug's Shira Vaccine (J&J/Shira)    Moderna   When did you get your most recent dose of the COVID-19 vaccine?    More than 14 days ago   Not selected:    Less than 48 hours (2 days) ago    48 to 72 hours (3 days) ago    3 to 5 days ago    5 to 7 days ago    7 to 14 days ago   In the last 14 days, have you traveled outside of your local community? This includes travel by car, RV, bus, train, or plane. Travel increases your chances of getting and spreading COVID-19. Select one.    No   Not selected:    Yes   In the last 14 days, have you had close contact with someone who has coronavirus (COVID-19)? \"Close contact\" means any of these: - Living in the same household as someone with COVID-19. - Caring for someone with COVID-19. - Being within 6 feet of someone with COVID-19 for a total of at least 15 minutes over a 24-hour period. For example, three 5-minute " exposures for a total of 15 minutes. - Being in direct contact with respiratory droplets from someone with COVID-19 (being coughed on, kissing, sharing utensils). Select one.    Yes, a confirmed case   Not selected:    Yes, a suspected case    No, not that I know of   When did the close contact happen? Select one.    3 to 7 days ago   Not selected:    Within the last 2 days    More than 7 days ago   Thanks for completing our COVID-19 questions. Now we'll return to your symptoms. When did your symptoms start? If you know the exact date your symptoms started, choose Other and enter the month and day. Select one.    3 to 5 days ago   Not selected:    Less than 48 hours ago    6 to 9 days ago    10 to 14 days ago    2 to 4 weeks ago    More than a month ago    Other (specify)   Did your symptoms come on suddenly or gradually? Select one.    Gradually   Not selected:    Suddenly    I'm not sure   You mentioned having a headache. On a scale of 1 to 10, how severe is your headache pain? Select one.    Moderate (4 to 6)   Not selected:    Mild (1 to 3)    Severe (7 to 9)    Unbearable (10)    The worst headache of my life (10+)   Do you cough so hard that it's made you gag or vomit? By gag, we mean has your coughing made you choke or dry heave? Select all that apply.    No   Not selected:    Yes, my coughing has made me gag    Yes, my coughing has made me vomit   When is your cough the worst? Select all that apply.    During the day   Not selected:    In the morning, or when I wake up    At nighttime, or while I'm sleeping    I'm not sure   Are you coughing up mucus or phlegm? Select one.    No, my cough is dry   Not selected:    Yes, a little    Yes, a lot   You mentioned having a stuffy nose or sinus congestion. Do you feel pain or pressure in your sinuses?    Yes   Not selected:    No    I'm not sure   Where do you feel sinus pain or pressure?    In my forehead    Around my eyes   Not selected:    Behind my nose    In my  "cheeks    In my upper teeth or jaw    I'm not sure   When did you first notice your sinus pain or pressure? Select one.    5 to 9 days ago   Not selected:    Less than 5 days ago    10 to 14 days ago    2 to 4 weeks ago    1 month ago or longer   Does coughing, sneezing, or leaning forward make your sinuses feel worse? Select one.    Yes   Not selected:    No    I'm not sure   What color is your nasal drainage? Select one.    White   Not selected:    Clear    Yellow    Green    My nose is stuffed but not draining or running    I'm not sure   Is your nasal drainage thick or thin? Select one.    I'm not sure   Not selected:    Thick    Thin   Is there any drainage (mucus) going down the back of your throat? This kind of drainage is also called \"postnasal drip.\" Select one.    No   Not selected:    Yes    I'm not sure   Since your symptoms started, have you felt dizzy? Select one.    Yes, but I can continue with my regular daily activities   Not selected:    Yes, and it makes it hard to stand, walk, or do daily activities    No   Do you have chest pain? You might also feel it as discomfort, aching, tightness, or squeezing in the chest. Select one.    No   Not selected:    Yes   Have you urinated at least 3 times in the last 24 hours? Select one.    Yes   Not selected:    No    I'm not sure   Changes in alertness or awareness may mean you need emergency care. Since your symptoms started, have you had any of these? Select all that apply.    None of the above   Not selected:    Confusion    Slurred speech    Not knowing where you are or what day it is    Difficulty staying conscious    Fainting or passing out   Do your symptoms include a whistling sound, or wheezing, when you breathe? Select one.    No   Not selected:    Yes    I'm not sure   Do you have any of these symptoms in your ear(s)? Select all that apply.    None of the above   Not selected:    Pain    Pressure    Fullness    Crackling or popping    Plugged or " blocked sensation   Can you move your chin toward your chest?    Yes   Not selected:    No, my neck is too stiff   Are your glands/lymph nodes swollen, or does it hurt when you touch them?    No   Not selected:    Yes    I'm not sure   People with a very high body mass index (BMI) are at higher risk for developing complications from the flu and severe illness from COVID-19. To determine your BMI, we need to know your weight and height. Please enter your weight (in pounds).    Weight   Please enter your height.    Height   In the past week, has anyone around you (such as at school, work, or home) had a confirmed diagnosis of the flu? A confirmed diagnosis means that a nose swab was done to verify a flu infection. Select all that apply.    No   Not selected:    I live with someone who has the flu    I've been within touching distance of someone who has the flu    I've walked by, or sat about 3 feet away from, someone who has the flu    I've been in the same building as someone who has the flu    I'm not sure   Have you ever been diagnosed with asthma? Select one.    No   Not selected:    Yes   Have you ever been prescribed albuterol to use for wheezing, cough, or shortness of breath caused by a cold, bronchitis, or pneumonia? Albuterol (ProAir, Proventil, Ventolin) is prescribed as an inhaler or a solution to be used with a nebulizer machine. Select one.    No   Not selected:    Yes    I'm not sure   Have you ever been prescribed a steroid inhaler to use for wheezing, cough, or shortness of breath caused by a cold, bronchitis, or pneumonia? Some examples of steroid inhalers include Pulmicort, Flovent, Qvar, and Alvesco. Select one.    No   Not selected:    Yes    I'm not sure   Have you ever been diagnosed with chronic obstructive pulmonary disease (COPD)? Select one.    No   Not selected:    Yes    I'm not sure   In the last year, how many times were you treated with antibiotics for a sinus infection? Select one.     None   Not selected:    1 to 3 times    4 or more times   Have you been diagnosed with a deviated septum or nasal polyps? The nose is divided into two nostrils by the septum. A crooked septum is called a deviated septum. Nasal polyps are growths inside the nose or sinuses. Select one.    No   Not selected:    Yes, but I had surgery to treat them    Yes, I have a deviated septum    Yes, I have nasal polyps    Yes, I have a deviated septum and nasal polyps    I'm not sure   Do you have a sore inside your nose that won't heal? Select one.    No   Not selected:    Yes    I'm not sure   Do you have allergies (pollen, dust mites, mold, animal dander)? Select one.    No   Not selected:    Yes    I'm not sure   Have you had a flu shot this season? Select one.    No   Not selected:    Yes, less than 2 weeks ago    Yes, 2 to 4 weeks ago    Yes, 1 to 3 months ago    Yes, 3 to 6 months ago    Yes, more than 6 months ago    I'm not sure   The flu and COVID-19 can be more serious for people with certain conditions or characteristics. These questions help us figure out if you or anyone you live with is at higher risk for complications from these infections. Do either of these statements apply to you? Select all that apply.    None of the above   Not selected:    I'm  or Native [place]    I'm a healthcare worker   Do you smoke tobacco? Select one.    No, never   Not selected:    Yes, every day    Yes, some days    No, I quit   Some conditions can put you at risk for more serious infections. Do any of these apply to you? Select all that apply.    None of the above   Not selected:    I've been hospitalized within the last 5 days    I have diabetes    I'm in close contact with a child in    Are you currently being treated for any of these conditions? Scroll to see all options. Select all that apply.    None of the above   Not selected:    Aspirin triad (also known as Samter's triad or ASA triad)    Asthma or  hives from taking aspirin or other NSAIDs, such as ibuprofen or naproxen    Blockage or narrowing of the blood vessels of the heart    Blood dyscrasia, such anemia, leukemia, lymphoma, or myeloma    Bone marrow depression    Catecholamine-releasing paraganglioma    Blood clotting disorder    Congenital long QT syndrome    Depression    Difficulty urinating or completely emptying your bladder    Uncorrected electrolyte abnormalities    Fungal infection    Gastrointestinal (GI) bleeding    Gastrointestinal (GI) obstruction    G6PD deficiency    Recent heart attack    High blood pressure    Irregular heartbeat or heart rhythm    Kidney disease or hemodialysis    Mononucleosis (mono)    Myasthenia gravis    Parkinson's disease    Pheochromocytoma    Reye syndrome    Seizure disorder    Ulcerative colitis   Do you have any of these conditions that can affect the immune system? Scroll to see all options. Select all that apply.    None of these   Not selected:    History of bone marrow transplant    Chronic kidney disease    Chronic liver disease (including cirrhosis)    HIV/AIDS    Inflammatory bowel disease (Crohn's disease or ulcerative colitis)    Lupus    Moderate to severe plaque psoriasis    Multiple sclerosis    Rheumatoid arthritis    Sickle cell anemia    Alpha or beta thalassemia    History of solid organ transplant (kidney, liver, or heart)    History of spleen removal    An autoimmune disorder not listed here    A condition requiring treatment with long-term use of oral steroids (such as prednisone, prednisolone, or dexamethasone)   Have you ever been diagnosed with cancer? Select one.    No   Not selected:    Yes, I have cancer now    Yes, but I'm in remission   Have you ever had either of these conditions? Select all that apply.    No   Not selected:    Metoclopramide-associated dystonic reaction    Tardive dyskinesia   Do any of these apply to the people who live with you? Select all that apply.    None of  the above   Not selected:    A child under the age of 5    An adult 65 or older    A person who is pregnant    A person who has given birth, had a miscarriage, had a pregnancy loss, or had an  in the last 2 weeks    An  or Native [place]   Does any member of your household have any of these medical conditions? Select all that apply.    None of the above   Not selected:    Asthma    Disorders of the brain, spinal cord, or nerves and muscles, such as dementia, cerebral palsy, epilepsy, muscular dystrophy, or developmental delay    Chronic lung disease, such as COPD or cystic fibrosis    Heart disease, such as congenital heart disease, congestive heart failure, or coronary artery disease    Cerebrovascular disease, such as stroke or another condition affecting the blood vessels or blood supply to the brain    Blood disorders, such as sickle cell disease    Diabetes    Metabolic disorders such as inherited metabolic disorders or mitochondrial disease    Kidney disorders    Liver disorders    Weakened immune system due to illness or medications such as chemotherapy or steroids    Children under the age of 19 who are on long-term aspirin therapy    Extreme obesity (BMI > 40)   Have you gone through menopause? Select one.    Yes   Not selected:    No   Just a few more questions about medications, and then you're finished. Have you used any non-prescription medications or nasal sprays for your current symptoms? Examples include saline sprays, decongestants, NyQuil, and Tylenol. Select one.    Yes   Not selected:    No   Which of these non-prescription medications have you tried? Scroll to see all options. Select all that apply.    Acetaminophen (Tylenol)    Dextromethorphan (Delsym, Robitussin, Vicks DayQuil Cough)    Guaifenesin/dextromethorphan (Delsym DM, Mucinex DM, Robitussin DM)    Oxymetazoline (Afrin)   Not selected:    Budesonide (Rhinocort)    Cetirizine (Zyrtec)    Chlorpheniramine  (Aller-chlor, Chlor-Trimeton)    Cromolyn (NasalCrom)    Diphenhydramine (Benadryl)    Fexofenadine (Allegra)    Fluticasone (Flonase)    Guaifenesin (Mucinex)    Ibuprofen (Advil, Motrin, Midol)    Ketotifen (Alaway, Zaditor)    Loratadine (Alavert, Claritin)    Naphazoline-pheniramine (Naphcon-A, Opcon-A, Visine-A)    Omeprazole (Prilosec)    Phenylephrine (Sudafed)    Triamcinolone (Nasacort)    None of the above   In the past month, have you taken antibiotics for similar symptoms? Examples of antibiotics include amoxicillin, amoxicillin-clavulanate (Augmentin), penicillin, cefdinir (Omnicef), doxycycline, and clindamycin (Cleocin). Select one.    No   Not selected:    Yes    I'm not sure   Have you taken any monoamine oxidase inhibitor (MAOI) medications in the last 14 days? Examples include rasagiline (Azilect), selegiline (Eldepryl, Zelapar), isocarboxazid (Marplan), phenelzine (Nardil), and tranylcypromine (Parnate). Select one.    No   Not selected:    Yes    I'm not sure   Do you take Kynmobi or Apokyn (apomorphine)? Select one.    No   Not selected:    Yes    I'm not sure   Are you taking any other medications or supplements? On the next screen, you need to list all vitamins, supplements, non-prescription medications (such as aspirin or Aleve), and prescription medications that you're taking. Select one.    No   Not selected:    Yes    Yes, but I'm not sure what they are   Have you ever had an allergic or bad reaction to any medication? Select one.    No   Not selected:    Yes   Are you allergic to milk or to the proteins found in milk (for example, whey or casein)? A milk allergy is different from lactose intolerance. Select one.    No   Not selected:    Yes    I'm not sure   Have you ever had jaundice or liver problems as a result of taking amoxicillin-clavulanate (Augmentin)? Jaundice is a condition in which the skin and the whites of the eyes turn yellow. Select all that apply.    No, not that I know  of   Not selected:    Yes, jaundice    Yes, liver problems   Have you ever had jaundice or liver problems as a result of taking azithromycin (Zithromax, Zmax)? Jaundice is a condition in which the skin and the whites of the eyes turn yellow. Select all that apply.    No, not that I know of   Not selected:    Yes, jaundice    Yes, liver problems   Do you need a doctor's note? A doctor's note confirms that you received care today and states when you can return to school or work. It does not contain information about your diagnosis or treatment plan. Your provider will make the final decision on whether to give you a doctor's note and for how long. Doctor's notes CANNOT be backdated. We can't provide medical leave paperwork through this type of visit. If more paperwork is needed to request time off, contact your primary care provider. Select one.    No   Not selected:    Today only (1 day)    Today and tomorrow (2 days)    3 days    7 days    10 days    14 days   Is there anything else you'd like to tell us about your symptoms?   The patient did not enter any additional information.   ----------   Medical history   Medical history data does not currently exist for this patient.

## 2022-01-11 ENCOUNTER — CLINICAL SUPPORT (OUTPATIENT)
Dept: FAMILY MEDICINE CLINIC | Facility: CLINIC | Age: 62
End: 2022-01-11

## 2022-01-11 DIAGNOSIS — Z20.822 CLOSE EXPOSURE TO COVID-19 VIRUS: Primary | ICD-10-CM

## 2022-01-11 PROCEDURE — 86769 SARS-COV-2 COVID-19 ANTIBODY: CPT | Performed by: NURSE PRACTITIONER

## 2022-01-11 PROCEDURE — 36415 COLL VENOUS BLD VENIPUNCTURE: CPT | Performed by: NURSE PRACTITIONER

## 2022-01-11 NOTE — PROGRESS NOTES
Venipuncture Blood Specimen Collection  Venipuncture performed in Right arm by Carly Lawler MA with good hemostasis. Patient tolerated the procedure well without complications.   01/11/22   Carly Lawler MA

## 2022-01-12 LAB
SARS-COV-2 AB SERPL-IMP: POSITIVE
SARS-COV-2 IGG SERPL IA-ACNC: 58.3 AU/ML

## 2022-02-23 ENCOUNTER — TRANSCRIBE ORDERS (OUTPATIENT)
Dept: ADMINISTRATIVE | Facility: HOSPITAL | Age: 62
End: 2022-02-23

## 2022-02-23 ENCOUNTER — HOSPITAL ENCOUNTER (OUTPATIENT)
Dept: GENERAL RADIOLOGY | Facility: HOSPITAL | Age: 62
Discharge: HOME | End: 2022-02-23

## 2022-02-23 DIAGNOSIS — M25.561 RIGHT KNEE PAIN, UNSPECIFIED CHRONICITY: Primary | ICD-10-CM

## 2022-02-23 DIAGNOSIS — M25.561 RIGHT KNEE PAIN, UNSPECIFIED CHRONICITY: ICD-10-CM

## 2022-02-23 PROCEDURE — 73564 X-RAY EXAM KNEE 4 OR MORE: CPT | Performed by: RADIOLOGY

## 2022-02-23 PROCEDURE — 73564 X-RAY EXAM KNEE 4 OR MORE: CPT

## 2022-07-11 ENCOUNTER — HOSPITAL ENCOUNTER (OUTPATIENT)
Dept: MAMMOGRAPHY | Facility: HOSPITAL | Age: 62
Discharge: HOME | End: 2022-07-11
Admitting: FAMILY MEDICINE

## 2022-07-11 DIAGNOSIS — Z12.31 VISIT FOR SCREENING MAMMOGRAM: ICD-10-CM

## 2022-07-11 PROCEDURE — 77063 BREAST TOMOSYNTHESIS BI: CPT | Performed by: RADIOLOGY

## 2022-07-11 PROCEDURE — 77063 BREAST TOMOSYNTHESIS BI: CPT

## 2022-07-11 PROCEDURE — 77067 SCR MAMMO BI INCL CAD: CPT

## 2022-07-11 PROCEDURE — 77067 SCR MAMMO BI INCL CAD: CPT | Performed by: RADIOLOGY

## 2022-10-24 ENCOUNTER — OFFICE VISIT (OUTPATIENT)
Dept: FAMILY MEDICINE CLINIC | Facility: CLINIC | Age: 62
End: 2022-10-24

## 2022-10-24 VITALS
SYSTOLIC BLOOD PRESSURE: 118 MMHG | DIASTOLIC BLOOD PRESSURE: 74 MMHG | TEMPERATURE: 97.7 F | OXYGEN SATURATION: 98 % | HEART RATE: 70 BPM

## 2022-10-24 DIAGNOSIS — J02.9 ACUTE PHARYNGITIS, UNSPECIFIED ETIOLOGY: Primary | ICD-10-CM

## 2022-10-24 LAB
EXPIRATION DATE: NORMAL
INTERNAL CONTROL: NORMAL
Lab: NORMAL
S PYO AG THROAT QL: NEGATIVE

## 2022-10-24 PROCEDURE — 87880 STREP A ASSAY W/OPTIC: CPT | Performed by: INTERNAL MEDICINE

## 2022-10-24 PROCEDURE — 99213 OFFICE O/P EST LOW 20 MIN: CPT | Performed by: INTERNAL MEDICINE

## 2022-10-24 RX ORDER — CEFDINIR 300 MG/1
300 CAPSULE ORAL 2 TIMES DAILY
Qty: 20 CAPSULE | Refills: 0 | Status: SHIPPED | OUTPATIENT
Start: 2022-10-24 | End: 2022-11-11

## 2022-10-24 NOTE — PROGRESS NOTES
Patient Name: Lisseth Cota Today's Date: 10/24/2022   Patient MRN / CSN: [id number] / [id number] Date of Encounter: 10/24/2022   Patient Age / : 62 y.o. / 1960 Encounter Provider: Ana Fan DO   Referring Physician: No ref. provider found          Lisseth is a 62 y.o. female who is being seen today for Sore Throat (x 4 weeks)      Sore Throat   This is a new problem. The current episode started 1 to 4 weeks ago. The problem has been waxing and waning (described as burning pain). Associated symptoms include coughing. Pertinent negatives include no ear pain, headaches or shortness of breath. Treatments tried: Amoxil. Improvement on treatment: [place] 3 to 4-day supply of amoxicillin.  She reports her symptoms resolved while taking it but recurred the day after she finished it.       Allergies include:Azithromycin, Penicillins, and Sulfa antibiotics  Current Outpatient Medications   Medication Sig Dispense Refill   • acyclovir (Zovirax) 5 % ointment Apply 1 application topically to the appropriate area as directed Every 3 (Three) Hours. 15 g 0   • Multiple Vitamin (MULTI-VITAMIN DAILY PO) Take  by mouth 2 (two) times a day.     • Premarin 0.625 MG/GM vaginal cream Use 0.5 grams intravaginally 2 times per week at bedtime. 30 g 4   • ASPIRIN ADULT LOW DOSE 81 MG EC tablet      • cefdinir (OMNICEF) 300 MG capsule Take 1 capsule by mouth 2 (Two) Times a Day. 20 capsule 0   • Melatonin 10 MG capsule Take  by mouth.       No current facility-administered medications for this visit.     Past Medical History:   Diagnosis Date   • Endometriosis    • Fibrocystic breast    • Menopausal symptoms    • S/P total abdominal hysterectomy     ovaries remain     Family History   Problem Relation Age of Onset   • Heart disease Mother         by-pass   • Heart disease Father    • Neuropathy Father    • Hypertension Father    • Breast cancer Neg Hx    • Uterine cancer Neg Hx    • Endometrial  cancer Neg Hx    • Ovarian cancer Neg Hx    • Colon cancer Neg Hx      Past Surgical History:   Procedure Laterality Date   • BLADDER SURGERY     •  SECTION     •  SECTION     •  SECTION     • COLONOSCOPY N/A 12/15/2017    Procedure: COLONOSCOPY FOR SCREENING  CPTCODE:67425;  Surgeon: Bimal Hoang III, MD;  Location: [facility];  Service:    • DILATATION AND CURETTAGE     • KNEE SURGERY  10/25/2018    left knee   • TOTAL ABDOMINAL HYSTERECTOMY  2011     Social History     Substance and Sexual Activity   Alcohol Use Yes     Social History     Tobacco Use   Smoking Status Never   Smokeless Tobacco Never     Social History     Substance and Sexual Activity   Drug Use No     Review of Systems   Constitutional: Negative for fever.   HENT: Positive for sore throat. Negative for ear pain.    Respiratory: Positive for cough. Negative for shortness of breath and wheezing.    Cardiovascular: Negative for chest pain.   Neurological: Negative for headaches.        Depression Assessment Review:  PHQ-9 Total Score: 0  Vital Signs & Measurements Taken This Encounter  /74 (BP Location: Left arm, Patient Position: Sitting, Cuff Size: Large Adult)   Pulse 70   Temp 97.7 °F (36.5 °C) (Temporal)   SpO2 98%    SpO2 Percentage    10/24/22 1628   SpO2: 98%        Physical Exam  Vitals reviewed.   Constitutional:       General: She is not in acute distress.  HENT:      Head: Normocephalic and atraumatic.      Right Ear: Tympanic membrane normal.      Left Ear: Tympanic membrane normal.      Mouth/Throat:      Mouth: Mucous membranes are moist.      Pharynx: Posterior oropharyngeal erythema present. No oropharyngeal exudate.   Eyes:      General: No scleral icterus.     Extraocular Movements: Extraocular movements intact.      Conjunctiva/sclera: Conjunctivae normal.      Pupils: Pupils are equal, round, and reactive to light.   Cardiovascular:      Rate and Rhythm: Normal rate and  regular rhythm.   Pulmonary:      Effort: Pulmonary effort is normal. No respiratory distress.      Breath sounds: Normal breath sounds. No wheezing or rhonchi.   Musculoskeletal:         General: No swelling.      Cervical back: Neck supple. Tenderness present.   Lymphadenopathy:      Cervical: Cervical adenopathy present.   Skin:     General: Skin is warm and dry.      Coloration: Skin is not jaundiced.   Neurological:      Mental Status: She is alert.   Psychiatric:         Mood and Affect: Mood normal.         Behavior: Behavior normal.            Assessment & Plan  Patient Active Problem List   Diagnosis   • Colon cancer screening       ICD-10-CM ICD-9-CM   1. Acute pharyngitis, unspecified etiology  J02.9 462     Orders Placed This Encounter   Procedures   • POC Rapid Strep A     Order Specific Question:   Release to patient     Answer:   Routine Release       Meds Ordered During Visit:  New Medications Ordered This Visit   Medications   • cefdinir (OMNICEF) 300 MG capsule     Sig: Take 1 capsule by mouth 2 (Two) Times a Day.     Dispense:  20 capsule     Refill:  0     Strep test is negative in the office today but patient has had a recent amoxicillin prescription.  Given her persistent symptoms and her only taking a partial prescription, I recommended completing a full round of antibiotics.  Given her allergies, I recommended Omnicef twice daily to take with food.    Return in about 1 month (around 11/24/2022), or if symptoms worsen or fail to improve, for Annual.          Referring Provider (if known): No ref. provider found      This document has been electronically signed by Ana Fan DO  October 24, 2022 17:28 EDT    Ana Fan DO, FACOI  [address] [place] [address]  [phone] (office)    Part of this note may be an electronic transcription/translation of spoken language to printed text using the Dragon Dictation System.

## 2022-11-11 ENCOUNTER — OFFICE VISIT (OUTPATIENT)
Dept: FAMILY MEDICINE CLINIC | Facility: CLINIC | Age: 62
End: 2022-11-11

## 2022-11-11 ENCOUNTER — TELEPHONE (OUTPATIENT)
Dept: FAMILY MEDICINE CLINIC | Facility: CLINIC | Age: 62
End: 2022-11-11

## 2022-11-11 VITALS
TEMPERATURE: 97.5 F | SYSTOLIC BLOOD PRESSURE: 124 MMHG | HEIGHT: 65 IN | BODY MASS INDEX: 29.49 KG/M2 | HEART RATE: 89 BPM | WEIGHT: 177 LBS | OXYGEN SATURATION: 99 % | DIASTOLIC BLOOD PRESSURE: 82 MMHG

## 2022-11-11 DIAGNOSIS — N30.00 ACUTE CYSTITIS WITHOUT HEMATURIA: ICD-10-CM

## 2022-11-11 DIAGNOSIS — R33.9 URINE RETENTION: Primary | ICD-10-CM

## 2022-11-11 LAB
BILIRUB BLD-MCNC: NEGATIVE MG/DL
CLARITY, POC: ABNORMAL
COLOR UR: ABNORMAL
GLUCOSE UR STRIP-MCNC: NEGATIVE MG/DL
KETONES UR QL: NEGATIVE
LEUKOCYTE EST, POC: ABNORMAL
NITRITE UR-MCNC: NEGATIVE MG/ML
PH UR: 6 [PH] (ref 5–8)
PROT UR STRIP-MCNC: NEGATIVE MG/DL
RBC # UR STRIP: NEGATIVE /UL
SP GR UR: 1.02 (ref 1–1.03)
UROBILINOGEN UR QL: ABNORMAL

## 2022-11-11 PROCEDURE — 99213 OFFICE O/P EST LOW 20 MIN: CPT | Performed by: NURSE PRACTITIONER

## 2022-11-11 PROCEDURE — 87186 SC STD MICRODIL/AGAR DIL: CPT | Performed by: NURSE PRACTITIONER

## 2022-11-11 PROCEDURE — 87086 URINE CULTURE/COLONY COUNT: CPT | Performed by: NURSE PRACTITIONER

## 2022-11-11 PROCEDURE — 81002 URINALYSIS NONAUTO W/O SCOPE: CPT | Performed by: NURSE PRACTITIONER

## 2022-11-11 RX ORDER — NITROFURANTOIN 25; 75 MG/1; MG/1
100 CAPSULE ORAL 2 TIMES DAILY
Qty: 10 CAPSULE | Refills: 0 | Status: SHIPPED | OUTPATIENT
Start: 2022-11-11 | End: 2022-11-16

## 2022-11-11 NOTE — PROGRESS NOTES
Patient Name: Lisseth Cota Today's Date: 2022   Patient MRN / CSN: [id number] / [id number] Date of Encounter: 2022   Patient Age / : 62 y.o. / 1960 Encounter Provider: ABBIE Buenrostro   Referring Physician: No ref. provider found          Lisseth is a 62 y.o. female who is being seen today for pressure with urination      Urinary Tract Infection   This is a new problem. The current episode started yesterday. The problem occurs every urination. The problem has been gradually worsening. The patient is experiencing no pain. There has been no fever. Associated symptoms include frequency and urgency. Pertinent negatives include no hematuria. She has tried nothing for the symptoms. The treatment provided no relief.       Allergies include:Azithromycin, Penicillins, and Sulfa antibiotics  Current Outpatient Medications   Medication Sig Dispense Refill   • acyclovir (Zovirax) 5 % ointment Apply 1 application topically to the appropriate area as directed Every 3 (Three) Hours. 15 g 0   • Multiple Vitamin (MULTI-VITAMIN DAILY PO) Take  by mouth 2 (two) times a day.     • Premarin 0.625 MG/GM vaginal cream Use 0.5 grams intravaginally 2 times per week at bedtime. 30 g 4   • cefdinir (OMNICEF) 300 MG capsule Take 1 capsule by mouth 2 (Two) Times a Day. 20 capsule 0   • nitrofurantoin, macrocrystal-monohydrate, (Macrobid) 100 MG capsule Take 1 capsule by mouth 2 (Two) Times a Day for 5 days. 10 capsule 0     No current facility-administered medications for this visit.     Past Medical History:   Diagnosis Date   • Endometriosis    • Fibrocystic breast    • Menopausal symptoms    • S/P total abdominal hysterectomy     ovaries remain     Family History   Problem Relation Age of Onset   • Heart disease Mother         by-pass   • Heart disease Father    • Neuropathy Father    • Hypertension Father    • Breast cancer Neg Hx    • Uterine cancer Neg Hx    • Endometrial cancer Neg Hx    • Ovarian cancer Neg Hx   "  • Colon cancer Neg Hx      Past Surgical History:   Procedure Laterality Date   • BLADDER SURGERY     •  SECTION     •  SECTION     •  SECTION     • COLONOSCOPY N/A 12/15/2017    Procedure: COLONOSCOPY FOR SCREENING  CPTCODE:86336;  Surgeon: Bimal Hoang III, MD;  Location: [facility];  Service:    • DILATATION AND CURETTAGE     • KNEE SURGERY  10/25/2018    left knee   • TOTAL ABDOMINAL HYSTERECTOMY  2011     Social History     Substance and Sexual Activity   Alcohol Use Yes     Social History     Tobacco Use   Smoking Status Never   Smokeless Tobacco Never     Social History     Substance and Sexual Activity   Drug Use No     Review of Systems   Constitutional: Negative.    HENT: Negative.    Eyes: Negative.    Respiratory: Negative.    Cardiovascular: Negative.    Gastrointestinal: Negative.    Endocrine: Negative.    Genitourinary: Positive for frequency and urgency. Negative for hematuria.   Musculoskeletal: Negative.    Skin: Negative.    Allergic/Immunologic: Negative.    Neurological: Negative.    Hematological: Negative.    Psychiatric/Behavioral: Negative.         Depression Assessment Review:  PHQ-9 Total Score:    Vital Signs & Measurements Taken This Encounter  /82 (BP Location: Left arm, Patient Position: Sitting, Cuff Size: Adult)   Pulse 89   Temp 97.5 °F (36.4 °C) (Temporal)   Ht 165.1 cm (65\")   Wt 80.3 kg (177 lb)   SpO2 99%   BMI 29.45 kg/m²    SpO2 Percentage    22 1508   SpO2: 99%        BMI is >= 25 and <30. (Overweight) The following options were offered after discussion;: exercise counseling/recommendations and nutrition counseling/recommendations      Physical Exam  Constitutional:       Appearance: Normal appearance.   Cardiovascular:      Rate and Rhythm: Normal rate and regular rhythm.   Pulmonary:      Effort: Pulmonary effort is normal.   Musculoskeletal:         General: Normal range of motion.   Skin:     General: " Skin is warm.   Neurological:      General: No focal deficit present.      Mental Status: She is alert and oriented to person, place, and time.   Psychiatric:         Mood and Affect: Mood normal.         Behavior: Behavior normal.          Fall Risk Assessment:  Fall Risk Assessment was completed, and patient is at low risk for falls.    Assessment & Plan    --She presents today with complaints of urinary frequency, and dysuria.  She states she feels more pressure than pain or burning.  The pressure is especially worse toward the end of her voiding.  UA was done today was positive for leukocyte esterase.  I will send the urine for culture.  I will also start her on Macrobid twice daily for 5 days.    Patient Active Problem List   Diagnosis   • Colon cancer screening       ICD-10-CM ICD-9-CM   1. Urine retention  R33.9 788.20   2. Acute cystitis without hematuria  N30.00 595.0     Orders Placed This Encounter   Procedures   • Urine Culture - Urine, Urine, Clean Catch   • POCT urinalysis dipstick, manual     Order Specific Question:   Release to patient     Answer:   Routine Release       Meds Ordered During Visit:  New Medications Ordered This Visit   Medications   • nitrofurantoin, macrocrystal-monohydrate, (Macrobid) 100 MG capsule     Sig: Take 1 capsule by mouth 2 (Two) Times a Day for 5 days.     Dispense:  10 capsule     Refill:  0       She has a regularly scheduled follow-up with Dr. Fan on 11/29/2022.          Referring Provider (if known): No ref. provider found    This document has been electronically signed by ABBIE Buenrostro  November 11, 2022 15:48 EST    ABBIE Buenrostro  [address] [place] [address] [phone] (office)    Part of this note may be an electronic transcription/translation of spoken language to printed text using the Dragon Dictation System.

## 2022-11-11 NOTE — TELEPHONE ENCOUNTER
Caller: Lisseth Cota    Relationship: Self    Best call back number: [phone]    What orders are you requesting (i.e. lab or imaging): [facility]ANALYSIS    In what timeframe would the patient need to come in: ASAP    Where will you receive your lab/imaging services: IN OFFICE     Additional notes: DISCOMFORT FROM UTI/BLADDER INFECTION

## 2022-11-13 LAB — BACTERIA SPEC AEROBE CULT: ABNORMAL

## 2022-11-14 RX ORDER — CEFDINIR 300 MG/1
300 CAPSULE ORAL 2 TIMES DAILY
Qty: 10 CAPSULE | Refills: 0 | Status: SHIPPED | OUTPATIENT
Start: 2022-11-14 | End: 2022-11-19

## 2022-11-14 NOTE — PROGRESS NOTES
Please inform her that her Urine culture growth has a bacteria that is resistant to the antibiotics started Friday. I have sent cefdiner to her pharmacy for her to take twice daily for the next 5 days.

## 2022-11-29 ENCOUNTER — OFFICE VISIT (OUTPATIENT)
Dept: FAMILY MEDICINE CLINIC | Facility: CLINIC | Age: 62
End: 2022-11-29

## 2022-11-29 VITALS
BODY MASS INDEX: 29.46 KG/M2 | SYSTOLIC BLOOD PRESSURE: 120 MMHG | DIASTOLIC BLOOD PRESSURE: 74 MMHG | HEIGHT: 65 IN | HEART RATE: 72 BPM | OXYGEN SATURATION: 98 % | TEMPERATURE: 97.3 F | WEIGHT: 176.8 LBS

## 2022-11-29 DIAGNOSIS — Z00.00 HEALTH CARE MAINTENANCE: ICD-10-CM

## 2022-11-29 DIAGNOSIS — Z00.00 ENCOUNTER FOR WELLNESS EXAMINATION: Primary | ICD-10-CM

## 2022-11-29 DIAGNOSIS — B00.1 FEVER BLISTER: ICD-10-CM

## 2022-11-29 PROCEDURE — 36415 COLL VENOUS BLD VENIPUNCTURE: CPT | Performed by: INTERNAL MEDICINE

## 2022-11-29 PROCEDURE — 80050 GENERAL HEALTH PANEL: CPT | Performed by: INTERNAL MEDICINE

## 2022-11-29 PROCEDURE — 90471 IMMUNIZATION ADMIN: CPT | Performed by: INTERNAL MEDICINE

## 2022-11-29 PROCEDURE — 99396 PREV VISIT EST AGE 40-64: CPT | Performed by: INTERNAL MEDICINE

## 2022-11-29 PROCEDURE — 90686 IIV4 VACC NO PRSV 0.5 ML IM: CPT | Performed by: INTERNAL MEDICINE

## 2022-11-29 PROCEDURE — 83036 HEMOGLOBIN GLYCOSYLATED A1C: CPT | Performed by: INTERNAL MEDICINE

## 2022-11-29 PROCEDURE — 80061 LIPID PANEL: CPT | Performed by: INTERNAL MEDICINE

## 2022-11-29 RX ORDER — ACYCLOVIR 50 MG/G
1 OINTMENT TOPICAL
Qty: 30 G | Refills: 5 | Status: SHIPPED | OUTPATIENT
Start: 2022-11-29 | End: 2022-12-21

## 2022-11-29 RX ORDER — HYDROCODONE BITARTRATE AND ACETAMINOPHEN 5; 325 MG/1; MG/1
1 TABLET ORAL EVERY 6 HOURS PRN
COMMUNITY
Start: 2022-11-09 | End: 2022-11-29

## 2022-11-29 RX ORDER — DOXYCYCLINE HYCLATE 100 MG/1
CAPSULE ORAL 2 TIMES DAILY
COMMUNITY
Start: 2022-11-21 | End: 2022-12-21

## 2022-11-29 RX ORDER — BENZONATATE 200 MG/1
200 CAPSULE ORAL 3 TIMES DAILY PRN
COMMUNITY
Start: 2022-11-22 | End: 2022-11-29

## 2022-11-29 NOTE — PROGRESS NOTES
Venipuncture Blood Specimen Collection  Venipuncture performed in LEFT ARM by Joanna Mills RN with good hemostasis. Patient tolerated the procedure well without complications.   11/29/22   Joanna Mills RN    Immunization  Immunization performed in LEFT ARM by Joanna Mills RN. Patient tolerated the procedure well without complications.  11/29/22   Joanna Mills RN

## 2022-11-29 NOTE — PROGRESS NOTES
Patient Name: Lisseth Cota Today's Date: 2022   Patient MRN / CSN: [id number] / [id number] Date of Encounter: 2022   Patient Age / : 62 y.o. / 1960 Encounter Provider: Ana Fan DO   Referring Physician: No ref. provider found          Lisseth is a 62 y.o. female who is being seen today for Annual Exam      History of Present Illness     Pt presents for HM exam today. Pt reports doing well.      Healthy Diet: yes  Exercise: yes  Regular Eye Exam: utd  Regular Dental Exam: utd  Hearing Loss: no  Immunizations: needs flu shot, will update today  Mammogram: utd  Pap: utd, managed by gyn  Colonoscopy: utd, done , repeat 10 years        Allergies include:Azithromycin, Penicillins, and Sulfa antibiotics  Current Outpatient Medications   Medication Sig Dispense Refill   • acyclovir (Zovirax) 5 % ointment Apply 1 application topically to the appropriate area as directed Every 3 (Three) Hours. 30 g 5   • doxycycline (VIBRAMYCIN) 100 MG capsule Take  by mouth 2 (Two) Times a Day.     • Multiple Vitamin (MULTI-VITAMIN DAILY PO) Take  by mouth 2 (two) times a day.     • Premarin 0.625 MG/GM vaginal cream Use 0.5 grams intravaginally 2 times per week at bedtime. 30 g 4     No current facility-administered medications for this visit.     Past Medical History:   Diagnosis Date   • Endometriosis    • Fibrocystic breast    • Menopausal symptoms    • S/P total abdominal hysterectomy     ovaries remain     Family History   Problem Relation Age of Onset   • Heart disease Mother         by-pass   • Heart disease Father    • Neuropathy Father    • Hypertension Father    • Breast cancer Neg Hx    • Uterine cancer Neg Hx    • Endometrial cancer Neg Hx    • Ovarian cancer Neg Hx    • Colon cancer Neg Hx      Past Surgical History:   Procedure Laterality Date   • BLADDER SURGERY     •  SECTION     •  SECTION     •  SECTION     • COLONOSCOPY N/A 12/15/2017    Procedure:  "COLONOSCOPY FOR SCREENING  CPTCODE:18420;  Surgeon: Bmial Hoang III, MD;  Location: [facility];  Service:    • DILATATION AND CURETTAGE     • KNEE SURGERY  10/25/2018    left knee   • TOTAL ABDOMINAL HYSTERECTOMY  08/08/2011     Social History     Substance and Sexual Activity   Alcohol Use Yes     Social History     Tobacco Use   Smoking Status Never   Smokeless Tobacco Never     Social History     Substance and Sexual Activity   Drug Use No     Review of Systems   Respiratory: Negative for wheezing.    Cardiovascular: Negative for chest pain.   Gastrointestinal: Negative for abdominal pain and blood in stool.   Genitourinary: Negative for hematuria.        Depression Assessment Review:  PHQ-9 Total Score:    Vital Signs & Measurements Taken This Encounter  /74 (BP Location: Right arm, Patient Position: Sitting, Cuff Size: Adult)   Pulse 72   Temp 97.3 °F (36.3 °C) (Temporal)   Ht 165.1 cm (65\")   Wt 80.2 kg (176 lb 12.8 oz)   SpO2 98%   BMI 29.42 kg/m²    SpO2 Percentage    11/29/22 1552   SpO2: 98%            Physical Exam  Vitals reviewed.   Constitutional:       General: She is not in acute distress.  HENT:      Head: Normocephalic and atraumatic.   Eyes:      General: No scleral icterus.     Extraocular Movements: Extraocular movements intact.      Conjunctiva/sclera: Conjunctivae normal.      Pupils: Pupils are equal, round, and reactive to light.   Cardiovascular:      Rate and Rhythm: Normal rate and regular rhythm.   Pulmonary:      Effort: Pulmonary effort is normal. No respiratory distress.      Breath sounds: Normal breath sounds. No wheezing or rhonchi.   Abdominal:      Palpations: Abdomen is soft.      Tenderness: There is no abdominal tenderness. There is no guarding or rebound.   Musculoskeletal:         General: No swelling.      Cervical back: Neck supple. No tenderness.   Lymphadenopathy:      Cervical: No cervical adenopathy.   Skin:     General: Skin is warm and dry.      " Coloration: Skin is not jaundiced.   Neurological:      Mental Status: She is alert.   Psychiatric:         Mood and Affect: Mood normal.         Behavior: Behavior normal.           Assessment & Plan  Patient Active Problem List   Diagnosis   • Colon cancer screening       ICD-10-CM ICD-9-CM   1. Encounter for wellness examination  Z00.00 V70.0   2. Health care maintenance  Z00.00 V70.0   3. Fever blister  B00.1 054.9     Orders Placed This Encounter   Procedures   • FluLaval/Fluarix/Fluzone >6 Months   • Comprehensive Metabolic Panel     Order Specific Question:   Release to patient     Answer:   Routine Release   • CBC (No Diff)     Order Specific Question:   Release to patient     Answer:   Routine Release   • Lipid Panel   • TSH     Order Specific Question:   Release to patient     Answer:   Routine Release   • Hemoglobin A1c     Order Specific Question:   Release to patient     Answer:   Routine Release       Meds Ordered During Visit:  New Medications Ordered This Visit   Medications   • acyclovir (Zovirax) 5 % ointment     Sig: Apply 1 application topically to the appropriate area as directed Every 3 (Three) Hours.     Dispense:  30 g     Refill:  5       Immunizations were discussed with patient today.  We will update flu shot today.    Age-appropriate screenings were discussed with patient today.  We will update metabolic screenings today.  Patient is up-to-date on mammogram, Pap smear, and colonoscopy.    I encouraged patient to continue her healthy eating and lifestyle habits.  Updated refill today as requested.      Return in about 1 year (around 11/29/2023), or if symptoms worsen or fail to improve, for Annual.          Referring Provider (if known): No ref. provider found      This document has been electronically signed by Ana Fan DO  November 29, 2022 16:30 EST    Ana Fan DO, FACOI  [address] [place] [address]  [phone] (office)    Part of this note may be an  electronic transcription/translation of spoken language to printed text using the Dragon Dictation System.

## 2022-12-01 LAB
ALBUMIN SERPL-MCNC: 4.5 G/DL (ref 3.5–5.2)
ALBUMIN/GLOB SERPL: 1.9 G/DL
ALP SERPL-CCNC: 84 U/L (ref 39–117)
ALT SERPL W P-5'-P-CCNC: 16 U/L (ref 1–33)
ANION GAP SERPL CALCULATED.3IONS-SCNC: 9 MMOL/L (ref 5–15)
AST SERPL-CCNC: 15 U/L (ref 1–32)
BILIRUB SERPL-MCNC: 0.3 MG/DL (ref 0–1.2)
BUN SERPL-MCNC: 19 MG/DL (ref 8–23)
BUN/CREAT SERPL: 24.7 (ref 7–25)
CALCIUM SPEC-SCNC: 9.4 MG/DL (ref 8.6–10.5)
CHLORIDE SERPL-SCNC: 102 MMOL/L (ref 98–107)
CHOLEST SERPL-MCNC: 180 MG/DL (ref 0–200)
CO2 SERPL-SCNC: 25 MMOL/L (ref 22–29)
CREAT SERPL-MCNC: 0.77 MG/DL (ref 0.57–1)
DEPRECATED RDW RBC AUTO: 38.9 FL (ref 37–54)
EGFRCR SERPLBLD CKD-EPI 2021: 88.4 ML/MIN/1.73
ERYTHROCYTE [DISTWIDTH] IN BLOOD BY AUTOMATED COUNT: 12.2 % (ref 12.3–15.4)
GLOBULIN UR ELPH-MCNC: 2.4 GM/DL
GLUCOSE SERPL-MCNC: 84 MG/DL (ref 65–99)
HBA1C MFR BLD: 5.3 % (ref 4.8–5.6)
HCT VFR BLD AUTO: 40.6 % (ref 34–46.6)
HDLC SERPL-MCNC: 35 MG/DL (ref 40–60)
HGB BLD-MCNC: 14.2 G/DL (ref 12–15.9)
LDLC SERPL CALC-MCNC: 104 MG/DL (ref 0–100)
LDLC/HDLC SERPL: 2.78 {RATIO}
MCH RBC QN AUTO: 30.8 PG (ref 26.6–33)
MCHC RBC AUTO-ENTMCNC: 35 G/DL (ref 31.5–35.7)
MCV RBC AUTO: 88.1 FL (ref 79–97)
PLATELET # BLD AUTO: 302 10*3/MM3 (ref 140–450)
PMV BLD AUTO: 11 FL (ref 6–12)
POTASSIUM SERPL-SCNC: 4.4 MMOL/L (ref 3.5–5.2)
PROT SERPL-MCNC: 6.9 G/DL (ref 6–8.5)
RBC # BLD AUTO: 4.61 10*6/MM3 (ref 3.77–5.28)
SODIUM SERPL-SCNC: 136 MMOL/L (ref 136–145)
TRIGL SERPL-MCNC: 239 MG/DL (ref 0–150)
TSH SERPL DL<=0.05 MIU/L-ACNC: 0.79 UIU/ML (ref 0.27–4.2)
VLDLC SERPL-MCNC: 41 MG/DL (ref 5–40)
WBC NRBC COR # BLD: 7.9 10*3/MM3 (ref 3.4–10.8)

## 2022-12-21 ENCOUNTER — OFFICE VISIT (OUTPATIENT)
Dept: OBSTETRICS AND GYNECOLOGY | Facility: CLINIC | Age: 62
End: 2022-12-21

## 2022-12-21 VITALS
SYSTOLIC BLOOD PRESSURE: 126 MMHG | WEIGHT: 180 LBS | RESPIRATION RATE: 16 BRPM | DIASTOLIC BLOOD PRESSURE: 80 MMHG | BODY MASS INDEX: 29.95 KG/M2

## 2022-12-21 DIAGNOSIS — N39.3 SUI (STRESS URINARY INCONTINENCE, FEMALE): ICD-10-CM

## 2022-12-21 DIAGNOSIS — N95.2 VAGINAL ATROPHY: ICD-10-CM

## 2022-12-21 DIAGNOSIS — Z01.411 ENCOUNTER FOR GYNECOLOGICAL EXAMINATION WITH ABNORMAL FINDING: Primary | ICD-10-CM

## 2022-12-21 PROCEDURE — 99396 PREV VISIT EST AGE 40-64: CPT | Performed by: NURSE PRACTITIONER

## 2022-12-21 RX ORDER — CONJUGATED ESTROGENS 0.62 MG/G
CREAM VAGINAL
Qty: 30 G | Refills: 4 | Status: SHIPPED | OUTPATIENT
Start: 2022-12-21

## 2022-12-21 NOTE — PROGRESS NOTES
Annual Visit     Patient Name: Lisseth Cota  : 1960   MRN: [id number]   Care Team: Patient Care Team:  Ana Fan DO as PCP - General (Family Medicine)  Collin Grant MD as PCP - Family Medicine  Lisa Wen DO (Inactive) as Consulting Physician (Obstetrics and Gynecology)  Ximena Narayan APRN as Nurse Practitioner (Family Medicine)    Chief Complaint:    Chief Complaint   Patient presents with   • Annual Exam       HPI: Lisseth Cota is a 62 y.o. year old  presenting to be seen for her gynecologic exam.   S/p total hyst - ovaries in situ   Perforation developed in the bladder after surgery and that was repaired approx 11 yrs ago   Reports SHELLIE that is very bothersome - asking about mgmt options      Changed back to Premarin vaginal cream last yr and doing well - isn't always consistent with 2x/wk use   Felt like estrace vaginal cream wasn't as helpful      Mammogram 2022 birads 1      Colonoscopy 2017 UTD per pt           Subjective      I have reviewed the patients family history, social history, past medical history, past surgical history and have updated it as appropriate.    /80   Resp 16   Wt 81.6 kg (180 lb)   LMP  (LMP Unknown)   BMI 29.95 kg/m²     BMI reviewed: Body mass index is 29.95 kg/m².      Objective     Physical Exam    Neuro: alert and oriented to person, place and time   General:  alert; cooperative; well developed; well nourished   Skin:  No suspicious lesions seen   Thyroid: normal to inspection and palpation   Lungs:  breathing is unlabored  clear to auscultation bilaterally   Heart:  regular rate and rhythm, S1, S2 normal, no murmur, click, rub or gallop  normal apical impulse   Breasts:  Examined in supine position  Symmetric without masses or skin dimpling  Nipples normal without inversion, lesions or discharge  There are no palpable axillary nodes   Abdomen: soft, non-tender; no masses  no umbilical or inguinal hernias are  present  no hepato-splenomegaly   Pelvis: Clinical staff was present for exam  External genitalia:  normal appearance of the external genitalia including Bartholin's and [place]'s glands.  :  urethral meatus normal;  Vaginal:  atrophic mucosal changes are present;  Cervix:  absent.  Uterus:  absent.  Adnexa:  normal bimanual exam of the adnexa.  Rectal:  digital rectal exam not performed; anus visually normal appearing.         Assessment / Plan      Assessment  Problems Addressed This Visit    ICD-10-CM ICD-9-CM   1. Encounter for gynecological examination with abnormal finding  Z01.411 V72.31   2. Vaginal atrophy  N95.2 627.3   3. SHELLIE (stress urinary incontinence, female)  N39.3 625.6       Plan    Discussed monthly SBEs and importance of annual imaging      Script for premarin vaginal cream 2x/wk use to pharmacy   copay card given today with sample     Discussed calcium and vit d for bone health   No family hx of osteoporosis   Will start DEXA by age 64     Discussed pelvic floor PT for SHELLIE   No cystocele noted on exam today   Order given - she will try to find a provider closer to home but if she can't, will let me know so I may order here at [facility] or [facility]      AV 1 yr          40 to 64: Counseling/Anticipatory Guidance Discussed: injury prevention, screenings and self-breast exam    Follow Up  Return in about 1 year (around 12/21/2023) for Annual physical.  Patient was given instructions and counseling regarding her condition or for health maintenance advice. Please see specific information pulled into the AVS if appropriate.     Hannah Ladd, ABBIE  December 21, 2022  10:30 EST

## 2022-12-29 ENCOUNTER — OFFICE VISIT (OUTPATIENT)
Dept: FAMILY MEDICINE CLINIC | Facility: CLINIC | Age: 62
End: 2022-12-29

## 2022-12-29 VITALS
SYSTOLIC BLOOD PRESSURE: 110 MMHG | OXYGEN SATURATION: 98 % | BODY MASS INDEX: 29.99 KG/M2 | DIASTOLIC BLOOD PRESSURE: 80 MMHG | WEIGHT: 180 LBS | HEIGHT: 65 IN | TEMPERATURE: 97.3 F | RESPIRATION RATE: 18 BRPM | HEART RATE: 103 BPM

## 2022-12-29 DIAGNOSIS — J02.9 PHARYNGITIS, UNSPECIFIED ETIOLOGY: Primary | ICD-10-CM

## 2022-12-29 LAB
EXPIRATION DATE: NORMAL
EXPIRATION DATE: NORMAL
FLUAV AG UPPER RESP QL IA.RAPID: NOT DETECTED
FLUBV AG UPPER RESP QL IA.RAPID: NOT DETECTED
INTERNAL CONTROL: NORMAL
INTERNAL CONTROL: NORMAL
Lab: NORMAL
Lab: NORMAL
S PYO AG THROAT QL: NEGATIVE
SARS-COV-2 AG UPPER RESP QL IA.RAPID: NOT DETECTED

## 2022-12-29 PROCEDURE — 87880 STREP A ASSAY W/OPTIC: CPT | Performed by: NURSE PRACTITIONER

## 2022-12-29 PROCEDURE — 87428 SARSCOV & INF VIR A&B AG IA: CPT | Performed by: NURSE PRACTITIONER

## 2022-12-29 PROCEDURE — 99213 OFFICE O/P EST LOW 20 MIN: CPT | Performed by: NURSE PRACTITIONER

## 2022-12-29 PROCEDURE — 87081 CULTURE SCREEN ONLY: CPT | Performed by: NURSE PRACTITIONER

## 2022-12-29 RX ORDER — CLINDAMYCIN HYDROCHLORIDE 300 MG/1
300 CAPSULE ORAL 3 TIMES DAILY
Qty: 30 CAPSULE | Refills: 0 | Status: SHIPPED | OUTPATIENT
Start: 2022-12-29 | End: 2023-01-08

## 2022-12-29 NOTE — PROGRESS NOTES
"Subjective   Lisseth Cota is a 62 y.o. female.     Chief Complaint   Patient presents with   • Sore Throat       History of Present Illness  She presents with c/o sore throat and cough for about a week. She had covid in august and again in October. She went to the [facility] in October and developed a sore throat. She was started on amoxicillin and finished it. She came back was doing ok. She saw Dr. Fan for sore throat. Strep was negative. She finished some cefdinir, felt better. She states the sore throat is not normal. She c/o cough since covid. She feels a tickle in her throat. She denies fever and chills. She did have the flu at [facility].        The following portions of the patient's history were reviewed and updated as appropriate: allergies, current medications, past family history, past medical history, past social history, past surgical history and problem list.    Review of Systems   Constitutional: Negative for fatigue, fever and unexpected weight change.   HENT: Positive for postnasal drip, rhinorrhea and sore throat. Negative for ear pain.    Respiratory: Positive for cough. Negative for chest tightness, shortness of breath and wheezing.    Cardiovascular: Negative for chest pain and palpitations.   Gastrointestinal: Negative for abdominal pain, blood in stool, constipation, diarrhea, nausea and vomiting.   Genitourinary: Negative for dysuria and hematuria.   Skin: Negative for rash.   Allergic/Immunologic: Negative for environmental allergies.   Neurological: Negative for dizziness and headaches.   Hematological: Positive for adenopathy.   Psychiatric/Behavioral: Negative for suicidal ideas. The patient is not nervous/anxious.        Objective     /80 (BP Location: Right arm, Patient Position: Sitting, Cuff Size: Large Adult)   Pulse 103   Temp 97.3 °F (36.3 °C) (Temporal)   Resp 18   Ht 165.1 cm (65\")   Wt 81.6 kg (180 lb)   LMP  (LMP Unknown)   SpO2 98%   BMI 29.95 kg/m² "     Physical Exam  Vitals reviewed.   Constitutional:       General: She is not in acute distress.     Appearance: Normal appearance. She is well-developed. She is not diaphoretic.   HENT:      Head: Normocephalic and atraumatic.      Right Ear: Hearing, tympanic membrane, ear canal and external ear normal.      Left Ear: Hearing, tympanic membrane, ear canal and external ear normal.      Nose: Nose normal.      Right Sinus: No maxillary sinus tenderness or frontal sinus tenderness.      Left Sinus: No maxillary sinus tenderness or frontal sinus tenderness.      Mouth/Throat:      Pharynx: Uvula midline. Pharyngeal swelling and posterior oropharyngeal erythema present.   Eyes:      General: Lids are normal.      Conjunctiva/sclera: Conjunctivae normal.   Neck:      Trachea: Trachea normal. No tracheal tenderness or tracheal deviation.   Cardiovascular:      Rate and Rhythm: Normal rate and regular rhythm.      Heart sounds: Normal heart sounds, S1 normal and S2 normal. No murmur heard.    No friction rub. No gallop.   Pulmonary:      Effort: Pulmonary effort is normal. No respiratory distress.      Breath sounds: Normal breath sounds.   Abdominal:      General: Bowel sounds are normal. There is no distension.      Palpations: Abdomen is soft.      Tenderness: There is no abdominal tenderness.   Lymphadenopathy:      Cervical: No cervical adenopathy.   Skin:     General: Skin is warm and dry.   Neurological:      Mental Status: She is alert and oriented to person, place, and time.   Psychiatric:         Behavior: Behavior normal.         Thought Content: Thought content normal.         Judgment: Judgment normal.         Current Outpatient Medications   Medication Sig Dispense Refill   • Multiple Vitamin (MULTI-VITAMIN DAILY PO) Take  by mouth 2 (two) times a day.     • Premarin 0.625 MG/GM vaginal cream Use 0.5 grams intravaginally 2 times per week at bedtime. 30 g 4   • clindamycin (CLEOCIN) 300 MG capsule Take 1  capsule by mouth 3 (Three) Times a Day for 10 days. 30 capsule 0     No current facility-administered medications for this visit.            Assessment & Plan     Problem List Items Addressed This Visit    None  Visit Diagnoses     Pharyngitis, unspecified etiology    -  Primary    Relevant Medications    clindamycin (CLEOCIN) 300 MG capsule    Other Relevant Orders    Throat / Upper Respiratory Culture - Swab, Throat    POC Rapid Strep A (Completed)    POCT SARS-CoV-2 Antigen KAMERON (Completed)            ICD-10-CM ICD-9-CM   1. Pharyngitis, unspecified etiology  J02.9 462       Plan: Strep, covid, and flu negative. Throat culture obtained. Magic mouthwash ordered. Clindamycin ordered for pharyngitis. Discuss recurrent pharyngitis with ENT when you see them next week. Follow up in 10 days if no better.     @Body mass index is 29.95 kg/m².                Understands disease processes and need for medications.  Understands reasons for urgent and emergent care.  Patient (& family) verbalized agreement for treatment plan.   Emotional support and active listening provided.  Patient provided time to verbalize feelings.           BMI is >= 25 and <30. (Overweight) The following options were offered after discussion;: weight loss educational material (shared in after visit summary)      This document has been electronically signed by ABBIE Schwartz   December 29, 2022 14:47 EST

## 2022-12-31 LAB — BACTERIA SPEC AEROBE CULT: NORMAL

## 2023-01-05 ENCOUNTER — OFFICE VISIT (OUTPATIENT)
Dept: FAMILY MEDICINE CLINIC | Facility: CLINIC | Age: 63
End: 2023-01-05
Payer: COMMERCIAL

## 2023-01-05 ENCOUNTER — LAB (OUTPATIENT)
Dept: LAB | Facility: HOSPITAL | Age: 63
End: 2023-01-05
Payer: COMMERCIAL

## 2023-01-05 VITALS
SYSTOLIC BLOOD PRESSURE: 138 MMHG | TEMPERATURE: 97.3 F | DIASTOLIC BLOOD PRESSURE: 88 MMHG | HEART RATE: 73 BPM | HEIGHT: 65 IN | BODY MASS INDEX: 30.49 KG/M2 | WEIGHT: 183 LBS | RESPIRATION RATE: 18 BRPM | OXYGEN SATURATION: 98 %

## 2023-01-05 DIAGNOSIS — R07.9 CHEST PAIN, UNSPECIFIED TYPE: Primary | ICD-10-CM

## 2023-01-05 DIAGNOSIS — K21.9 GASTROESOPHAGEAL REFLUX DISEASE, UNSPECIFIED WHETHER ESOPHAGITIS PRESENT: ICD-10-CM

## 2023-01-05 LAB — TROPONIN T SERPL-MCNC: <0.01 NG/ML (ref 0–0.03)

## 2023-01-05 PROCEDURE — 84484 ASSAY OF TROPONIN QUANT: CPT | Performed by: NURSE PRACTITIONER

## 2023-01-05 PROCEDURE — 99214 OFFICE O/P EST MOD 30 MIN: CPT | Performed by: NURSE PRACTITIONER

## 2023-01-05 PROCEDURE — 36415 COLL VENOUS BLD VENIPUNCTURE: CPT | Performed by: NURSE PRACTITIONER

## 2023-01-05 NOTE — PROGRESS NOTES
Subjective   Lisseth Cota is a 62 y.o. female.     Chief Complaint   Patient presents with   • Heartburn       History of Present Illness  She presents with c/o a lot of heartburn yesterday. She states she took pepto bismol that took care of it. She states it was so uncomfortable in her chest. She walked around and ate something. She c/o pain in the left side of her neck. She has been going to the gym on campus. She c/o her jaw hurting this afternoon. She is not sure if she is having chest pain or heart burn now. She denies shortness of breath and palpitations. She states her sore throat is feeling better. She is still taking antibiotics. Pain is described as burning in the epigastrum, so bad that she couldn't sit there, she had to stand.          The following portions of the patient's history were reviewed and updated as appropriate: allergies, current medications, past family history, past medical history, past social history, past surgical history and problem list.    Review of Systems   Constitutional: Negative for fatigue, fever and unexpected weight change.   HENT: Negative for ear pain, rhinorrhea and sore throat.    Respiratory: Negative for cough, chest tightness, shortness of breath and wheezing.    Cardiovascular: Positive for chest pain. Negative for palpitations.   Gastrointestinal: Negative for abdominal pain, blood in stool, constipation, diarrhea, nausea and vomiting.   Genitourinary: Negative for dysuria and hematuria.   Musculoskeletal: Positive for back pain (chronic).   Allergic/Immunologic: Negative for environmental allergies.   Neurological: Negative for dizziness and headaches.   Psychiatric/Behavioral: Negative for suicidal ideas. The patient is not nervous/anxious.        Objective     /88 (BP Location: Right arm, Patient Position: Sitting, Cuff Size: Adult)   Pulse 73   Temp 97.3 °F (36.3 °C) (Temporal)   Resp 18   Ht 165.1 cm (65\")   Wt 83 kg (183 lb)   LMP  (LMP Unknown)    SpO2 98%   BMI 30.45 kg/m²     Physical Exam  Vitals reviewed.   Constitutional:       General: She is not in acute distress.     Appearance: Normal appearance. She is well-developed. She is not diaphoretic.   HENT:      Head: Normocephalic and atraumatic.      Right Ear: Hearing, tympanic membrane, ear canal and external ear normal.      Left Ear: Hearing, tympanic membrane, ear canal and external ear normal.      Nose: Nose normal.      Right Sinus: No maxillary sinus tenderness or frontal sinus tenderness.      Left Sinus: No maxillary sinus tenderness or frontal sinus tenderness.      Mouth/Throat:      Pharynx: Uvula midline.   Eyes:      General: Lids are normal.      Conjunctiva/sclera: Conjunctivae normal.   Neck:      Trachea: Trachea normal. No tracheal tenderness or tracheal deviation.   Cardiovascular:      Rate and Rhythm: Normal rate and regular rhythm.      Heart sounds: Normal heart sounds, S1 normal and S2 normal. No murmur heard.    No friction rub. No gallop.   Pulmonary:      Effort: Pulmonary effort is normal. No respiratory distress.      Breath sounds: Normal breath sounds.   Abdominal:      General: Bowel sounds are normal. There is no distension.      Palpations: Abdomen is soft.      Tenderness: There is no abdominal tenderness.   Lymphadenopathy:      Cervical: No cervical adenopathy.   Skin:     General: Skin is warm and dry.   Neurological:      Mental Status: She is alert and oriented to person, place, and time.   Psychiatric:         Behavior: Behavior normal.         Thought Content: Thought content normal.         Judgment: Judgment normal.         Current Outpatient Medications   Medication Sig Dispense Refill   • clindamycin (CLEOCIN) 300 MG capsule Take 1 capsule by mouth 3 (Three) Times a Day for 10 days. 30 capsule 0   • Multiple Vitamin (MULTI-VITAMIN DAILY PO) Take  by mouth 2 (two) times a day.     • Premarin 0.625 MG/GM vaginal cream Use 0.5 grams intravaginally 2 times per  week at bedtime. 30 g 4     No current facility-administered medications for this visit.            Assessment & Plan     Problem List Items Addressed This Visit    None  Visit Diagnoses     Chest pain, unspecified type    -  Primary    Relevant Orders    Troponin (Completed)    Gastroesophageal reflux disease, unspecified whether esophagitis present                ICD-10-CM ICD-9-CM   1. Chest pain, unspecified type  R07.9 786.50   2. Gastroesophageal reflux disease, unspecified whether esophagitis present  K21.9 530.81       Procedure     ECG 12 Lead    Date/Time: 1/6/2023 8:50 AM  Performed by: Ximena Narayan APRN  Authorized by: Ximena Narayan APRN   Previous ECG: no previous ECG available  Rhythm: sinus rhythm  Rate: normal  Conduction: conduction normal  ST Segments: ST segments normal  QRS axis: normal    Clinical impression: normal ECG               Plan: EKG is normal. Most likely clindamycin is causing GI upset, but because pain is also in her neck and her jaw, it could be cardiac related. I recommend she go to the emergency room. She declines. Troponin ordered and stress test ordered. Follow up in 6 weeks and pending results.     @Body mass index is 30.45 kg/m².              Understands disease processes and need for medications.  Understands reasons for urgent and emergent care.  Patient (& family) verbalized agreement for treatment plan.   Emotional support and active listening provided.  Patient provided time to verbalize feelings.           BMI is >= 25 and <30. (Overweight) The following options were offered after discussion;: weight loss educational material (shared in after visit summary)      This document has been electronically signed by ABBIE Schwartz   January 6, 2023 08:50 EST

## 2023-01-06 PROCEDURE — 93000 ELECTROCARDIOGRAM COMPLETE: CPT | Performed by: NURSE PRACTITIONER

## 2023-01-09 PROBLEM — K21.9 GASTROESOPHAGEAL REFLUX DISEASE: Status: ACTIVE | Noted: 2023-01-09

## 2023-01-19 ENCOUNTER — OFFICE VISIT (OUTPATIENT)
Dept: FAMILY MEDICINE CLINIC | Facility: CLINIC | Age: 63
End: 2023-01-19
Payer: COMMERCIAL

## 2023-01-19 VITALS
BODY MASS INDEX: 30.45 KG/M2 | HEART RATE: 85 BPM | SYSTOLIC BLOOD PRESSURE: 112 MMHG | TEMPERATURE: 98 F | DIASTOLIC BLOOD PRESSURE: 70 MMHG | OXYGEN SATURATION: 96 % | WEIGHT: 183 LBS

## 2023-01-19 DIAGNOSIS — R04.0 RECURRENT EPISTAXIS: ICD-10-CM

## 2023-01-19 DIAGNOSIS — K21.9 GASTROESOPHAGEAL REFLUX DISEASE, UNSPECIFIED WHETHER ESOPHAGITIS PRESENT: Primary | ICD-10-CM

## 2023-01-19 PROCEDURE — 99214 OFFICE O/P EST MOD 30 MIN: CPT | Performed by: INTERNAL MEDICINE

## 2023-01-19 RX ORDER — ESOMEPRAZOLE MAGNESIUM 40 MG/1
40 CAPSULE, DELAYED RELEASE ORAL
Qty: 30 CAPSULE | Refills: 5 | Status: SHIPPED | OUTPATIENT
Start: 2023-01-19

## 2023-01-19 NOTE — PROGRESS NOTES
Patient Name: Lisseth Cota Today's Date: 2023   Patient MRN / CSN: [id number] / [id number] Date of Encounter: 2023   Patient Age / : 62 y.o. / 1960 Encounter Provider: Ana Fan DO   Referring Physician: No ref. provider found          Lisseth is a 62 y.o. female who is being seen today for Sore Throat      History of Present Illness     Lisseth presents today for recurrent sore throat.  She reports the symptoms started recurring a couple of days ago and have worsened slightly since onset.  She denies ear pain, fever, or sinus pain.  She reports an occasional dry cough.  She reports feeling well other than this sore throat.  She is not had any sick contacts at home.    She has had a recurrence of the sore throat over the past several months.  She had COVID in August and again in October.  She was seen in October after returning from the [facility].  She developed a sore throat while [place] country and had been prescribed a 3-day course of amoxicillin.  Her symptoms quickly recurred after completing Amoxil and she was treated with a 10-day course of Omnicef.  She felt some better after Omnicef but the sore throat has recurred again.  She has seen Ximena recently with throat culture done which was negative.  She was treated with clindamycin at that time    Lisseth also had an episode of heartburn for which she saw Ximena 2 weeks ago.  Troponin and ECG at that time were normal.  She had been taking clindamycin at the time for recurrent sore throat and it was felt that clindamycin had caused GI upset. She denies frequent heartburn but does note intermittent acid reflux and an acidic taste in her mouth in the mornings at time.  She also notices a raspy voice.  She has not been take anything for acid reflux.    Allergies include:Azithromycin, Penicillins, and Sulfa antibiotics  Current Outpatient Medications   Medication Sig Dispense Refill   • Multiple Vitamin (MULTI-VITAMIN DAILY PO)  Take  by mouth 2 (two) times a day.     • Premarin 0.625 MG/GM vaginal cream Use 0.5 grams intravaginally 2 times per week at bedtime. 30 g 4   • esomeprazole (nexIUM) 40 MG capsule Take 1 capsule by mouth Every Morning Before Breakfast. 30 capsule 5     No current facility-administered medications for this visit.     Past Medical History:   Diagnosis Date   • Endometriosis    • Fibrocystic breast    • Menopausal symptoms    • S/P total abdominal hysterectomy     ovaries remain     Family History   Problem Relation Age of Onset   • Heart disease Mother         by-pass   • Heart disease Father    • Neuropathy Father    • Hypertension Father    • Breast cancer Neg Hx    • Uterine cancer Neg Hx    • Endometrial cancer Neg Hx    • Ovarian cancer Neg Hx    • Colon cancer Neg Hx      Past Surgical History:   Procedure Laterality Date   • BLADDER SURGERY     •  SECTION     •  SECTION     •  SECTION     • COLONOSCOPY N/A 12/15/2017    Procedure: COLONOSCOPY FOR SCREENING  CPTCODE:65101;  Surgeon: Bimal Hoang III, MD;  Location: [facility];  Service:    • DILATATION AND CURETTAGE     • KNEE SURGERY  10/25/2018    left knee   • TOTAL ABDOMINAL HYSTERECTOMY  2011     Social History     Substance and Sexual Activity   Alcohol Use Yes    Comment: Occasional social drink     Social History     Tobacco Use   Smoking Status Never   Smokeless Tobacco Never     Social History     Substance and Sexual Activity   Drug Use Never     Review of Systems   Constitutional: Negative for fever.   HENT: Positive for sore throat and voice change. Negative for sinus pressure and sinus pain.         Recurrent left nostril epistaxis   Respiratory: Negative for shortness of breath.    Cardiovascular: Negative for chest pain.   Gastrointestinal: Negative for abdominal pain and blood in stool.   Genitourinary: Negative for hematuria.        Depression Assessment Review:  PHQ-9 Total Score:    Vital  Signs & Measurements Taken This Encounter  /70 (BP Location: Left arm, Patient Position: Sitting, Cuff Size: Adult)   Pulse 85   Temp 98 °F (36.7 °C) (Temporal)   Wt 83 kg (183 lb)   SpO2 96%   BMI 30.45 kg/m²    SpO2 Percentage    01/19/23 1607   SpO2: 96%     Physical Exam  Vitals reviewed.   Constitutional:       General: She is not in acute distress.  HENT:      Head: Normocephalic and atraumatic.      Comments: No maxillary or frontal sinus tenderness to palpation.     Right Ear: Tympanic membrane normal.      Left Ear: Tympanic membrane normal.      Mouth/Throat:      Mouth: Mucous membranes are moist.      Pharynx: Posterior oropharyngeal erythema present. No oropharyngeal exudate.   Eyes:      General: No scleral icterus.     Extraocular Movements: Extraocular movements intact.      Conjunctiva/sclera: Conjunctivae normal.      Pupils: Pupils are equal, round, and reactive to light.   Cardiovascular:      Rate and Rhythm: Normal rate and regular rhythm.   Pulmonary:      Effort: Pulmonary effort is normal. No respiratory distress.      Breath sounds: Normal breath sounds. No wheezing or rhonchi.   Abdominal:      Palpations: Abdomen is soft.      Tenderness: There is no abdominal tenderness. There is no guarding or rebound.   Musculoskeletal:         General: No swelling.      Cervical back: Neck supple. No tenderness.   Lymphadenopathy:      Cervical: No cervical adenopathy.   Skin:     General: Skin is warm and dry.      Coloration: Skin is not jaundiced.   Neurological:      Mental Status: She is alert.   Psychiatric:         Mood and Affect: Mood normal.         Behavior: Behavior normal.            Assessment & Plan  Patient Active Problem List   Diagnosis   • Colon cancer screening   • Gastroesophageal reflux disease       ICD-10-CM ICD-9-CM   1. Gastroesophageal reflux disease, unspecified whether esophagitis present  K21.9 530.81   2. Recurrent epistaxis  R04.0 784.7     Orders Placed This  Encounter   Procedures   • Ambulatory Referral to ENT (Otolaryngology)     Referral Priority:   Routine     Referral Type:   Consultation     Referral Reason:   Specialty Services Required     Requested Specialty:   Otolaryngology     Number of Visits Requested:   1       Meds Ordered During Visit:  New Medications Ordered This Visit   Medications   • esomeprazole (nexIUM) 40 MG capsule     Sig: Take 1 capsule by mouth Every Morning Before Breakfast.     Dispense:  30 capsule     Refill:  5     I reviewed recent notes from Ximena.  I recommended starting Nexium daily.  I do not see signs of a bacterial infection on exam today and we agreed to hold on antibiotics at this time.  I will refer to ENT.    Return if symptoms worsen or fail to improve, for Next scheduled follow up.          Referring Provider (if known): No ref. provider found      This document has been electronically signed by Ana Fan DO  January 19, 2023 18:33 EST    Ana Fan DO, FACOI  [address] [place] [address]  [phone] (office)    Part of this note may be an electronic transcription/translation of spoken language to printed text using the Dragon Dictation System.

## 2023-11-29 ENCOUNTER — TELEPHONE (OUTPATIENT)
Dept: FAMILY MEDICINE CLINIC | Facility: CLINIC | Age: 63
End: 2023-11-29

## 2023-11-29 DIAGNOSIS — Z00.00 ENCOUNTER FOR WELLNESS EXAMINATION: ICD-10-CM

## 2023-11-29 DIAGNOSIS — Z51.81 MEDICATION MONITORING ENCOUNTER: Primary | ICD-10-CM

## 2023-11-29 NOTE — TELEPHONE ENCOUNTER
Caller: Lisseth Cota    Relationship: Self    Best call back number: [phone]     What orders are you requesting (i.e. lab or imaging): PHYSICAL LABS     In what timeframe would the patient need to come in: BEFORE THE DEC 6TH APPOINTMENT     Where will you receive your lab/imaging services: OFFICE     Additional notes: PLEASE CALL TO SCHEDULE LABS. WANTS THEM BEFORE HER ANNUAL SO THEY CAN DISCUSS THE RESULTS

## 2023-12-01 ENCOUNTER — CLINICAL SUPPORT (OUTPATIENT)
Dept: FAMILY MEDICINE CLINIC | Facility: CLINIC | Age: 63
End: 2023-12-01
Payer: COMMERCIAL

## 2023-12-01 DIAGNOSIS — Z00.00 ENCOUNTER FOR WELLNESS EXAMINATION: ICD-10-CM

## 2023-12-01 DIAGNOSIS — Z51.81 MEDICATION MONITORING ENCOUNTER: ICD-10-CM

## 2023-12-01 LAB
ALBUMIN SERPL-MCNC: 4.6 G/DL (ref 3.5–5.2)
ALBUMIN/GLOB SERPL: 1.7 G/DL
ALP SERPL-CCNC: 86 U/L (ref 39–117)
ALT SERPL W P-5'-P-CCNC: 20 U/L (ref 1–33)
ANION GAP SERPL CALCULATED.3IONS-SCNC: 11.5 MMOL/L (ref 5–15)
AST SERPL-CCNC: 18 U/L (ref 1–32)
BILIRUB SERPL-MCNC: 0.4 MG/DL (ref 0–1.2)
BUN SERPL-MCNC: 15 MG/DL (ref 8–23)
BUN/CREAT SERPL: 19.2 (ref 7–25)
CALCIUM SPEC-SCNC: 9.2 MG/DL (ref 8.6–10.5)
CHLORIDE SERPL-SCNC: 105 MMOL/L (ref 98–107)
CHOLEST SERPL-MCNC: 174 MG/DL (ref 0–200)
CO2 SERPL-SCNC: 22.5 MMOL/L (ref 22–29)
CREAT SERPL-MCNC: 0.78 MG/DL (ref 0.57–1)
DEPRECATED RDW RBC AUTO: 39.3 FL (ref 37–54)
EGFRCR SERPLBLD CKD-EPI 2021: 86.5 ML/MIN/1.73
ERYTHROCYTE [DISTWIDTH] IN BLOOD BY AUTOMATED COUNT: 11.9 % (ref 12.3–15.4)
GLOBULIN UR ELPH-MCNC: 2.7 GM/DL
GLUCOSE SERPL-MCNC: 112 MG/DL (ref 65–99)
HBA1C MFR BLD: 5.4 % (ref 4.8–5.6)
HCT VFR BLD AUTO: 41.4 % (ref 34–46.6)
HDLC SERPL-MCNC: 44 MG/DL (ref 40–60)
HGB BLD-MCNC: 14.4 G/DL (ref 12–15.9)
LDLC SERPL CALC-MCNC: 117 MG/DL (ref 0–100)
LDLC/HDLC SERPL: 2.65 {RATIO}
MCH RBC QN AUTO: 31.7 PG (ref 26.6–33)
MCHC RBC AUTO-ENTMCNC: 34.8 G/DL (ref 31.5–35.7)
MCV RBC AUTO: 91.2 FL (ref 79–97)
PLATELET # BLD AUTO: 286 10*3/MM3 (ref 140–450)
PMV BLD AUTO: 11.1 FL (ref 6–12)
POTASSIUM SERPL-SCNC: 4.2 MMOL/L (ref 3.5–5.2)
PROT SERPL-MCNC: 7.3 G/DL (ref 6–8.5)
RBC # BLD AUTO: 4.54 10*6/MM3 (ref 3.77–5.28)
SODIUM SERPL-SCNC: 139 MMOL/L (ref 136–145)
TRIGL SERPL-MCNC: 67 MG/DL (ref 0–150)
TSH SERPL DL<=0.05 MIU/L-ACNC: 1.28 UIU/ML (ref 0.27–4.2)
VLDLC SERPL-MCNC: 13 MG/DL (ref 5–40)
WBC NRBC COR # BLD AUTO: 5.54 10*3/MM3 (ref 3.4–10.8)

## 2023-12-01 PROCEDURE — 83036 HEMOGLOBIN GLYCOSYLATED A1C: CPT | Performed by: INTERNAL MEDICINE

## 2023-12-01 PROCEDURE — 80061 LIPID PANEL: CPT | Performed by: INTERNAL MEDICINE

## 2023-12-01 PROCEDURE — 80050 GENERAL HEALTH PANEL: CPT | Performed by: INTERNAL MEDICINE

## 2023-12-01 NOTE — PROGRESS NOTES
Venipuncture Blood Specimen Collection  Venipuncture performed in right arm by Yvonne Villarreal MA with good hemostasis. Patient tolerated the procedure well without complications.   12/01/23   Yvonne Villarreal MA

## 2023-12-06 ENCOUNTER — OFFICE VISIT (OUTPATIENT)
Dept: FAMILY MEDICINE CLINIC | Facility: CLINIC | Age: 63
End: 2023-12-06
Payer: COMMERCIAL

## 2023-12-06 VITALS
DIASTOLIC BLOOD PRESSURE: 76 MMHG | BODY MASS INDEX: 30.79 KG/M2 | WEIGHT: 185 LBS | TEMPERATURE: 97.3 F | OXYGEN SATURATION: 96 % | SYSTOLIC BLOOD PRESSURE: 118 MMHG | HEART RATE: 92 BPM

## 2023-12-06 DIAGNOSIS — Z00.00 ENCOUNTER FOR WELLNESS EXAMINATION: Primary | ICD-10-CM

## 2023-12-06 DIAGNOSIS — Z00.00 HEALTH CARE MAINTENANCE: ICD-10-CM

## 2023-12-06 DIAGNOSIS — Z12.31 ENCOUNTER FOR SCREENING MAMMOGRAM FOR MALIGNANT NEOPLASM OF BREAST: ICD-10-CM

## 2023-12-06 NOTE — PROGRESS NOTES
Patient Name: Lisseth Cota Today's Date: 2023   Patient MRN / CSN: [id number] / [id number] Date of Encounter: 2023   Patient Age / : 63 y.o. / 1960 Encounter Provider: Ana Fan DO   Referring Physician: No ref. provider found          Lisseth is a 63 y.o. female who is being seen today for Annual Exam      History of Present Illness      Pt presents for  exam today. Pt reports doing well.      Healthy Diet: trying to   Exercise: regularly  Regular Eye Exam: up to date  Regular Dental Exam: up to date  Hearing Loss: no  Immunizations: declines flu shot  Mammogram: due, will schedule  Pap: following with gyn at [facility] in [place]  Colonoscopy: , repeat in 10 years      Allergies include:Azithromycin, Penicillins, and Sulfa antibiotics  Current Outpatient Medications   Medication Sig Dispense Refill    esomeprazole (nexIUM) 40 MG capsule Take 1 capsule by mouth Every Morning Before Breakfast. (Patient taking differently: Take 1 capsule by mouth Daily As Needed.) 30 capsule 5    Multiple Vitamin (MULTI-VITAMIN DAILY PO) Take  by mouth 2 (two) times a day.      Premarin 0.625 MG/GM vaginal cream Use 0.5 grams intravaginally 2 times per week at bedtime. 30 g 4     No current facility-administered medications for this visit.     Past Medical History:   Diagnosis Date    Endometriosis     Fibrocystic breast     Menopausal symptoms     S/P total abdominal hysterectomy     ovaries remain     Family History   Problem Relation Age of Onset    Heart disease Mother         by-pass    Other Mother         Lupus    Heart disease Father     Neuropathy Father     Hypertension Father     Breast cancer Neg Hx     Uterine cancer Neg Hx     Endometrial cancer Neg Hx     Ovarian cancer Neg Hx     Colon cancer Neg Hx      Past Surgical History:   Procedure Laterality Date    BLADDER SURGERY       SECTION       SECTION       SECTION      COLONOSCOPY N/A 12/15/2017     Procedure: COLONOSCOPY FOR SCREENING  CPTCODE:19293;  Surgeon: Bimal Hoang III, MD;  Location: [facility];  Service:     DILATATION AND CURETTAGE      JOINT REPLACEMENT  10/20/2017    Patial left kneee    KNEE SURGERY  10/25/2018    left knee    TOTAL ABDOMINAL HYSTERECTOMY  08/08/2011     Social History     Substance and Sexual Activity   Alcohol Use Not Currently    Comment: Occasional social drink     Social History     Tobacco Use   Smoking Status Never   Smokeless Tobacco Never     Social History     Substance and Sexual Activity   Drug Use Never     Review of Systems   Constitutional:  Negative for fever.   Respiratory:  Negative for shortness of breath.    Cardiovascular:  Negative for chest pain.   Gastrointestinal:  Negative for abdominal pain and blood in stool.   Genitourinary:  Negative for hematuria.        Depression Assessment Review:  PHQ-9 Total Score: 0  Vital Signs & Measurements Taken This Encounter  /76 (BP Location: Left arm, Patient Position: Sitting, Cuff Size: Adult)   Pulse 92   Temp 97.3 °F (36.3 °C) (Temporal)   Wt 83.9 kg (185 lb)   SpO2 96%   BMI 30.79 kg/m²    SpO2 Percentage    12/06/23 0855   SpO2: 96%          Physical Exam  Vitals reviewed.   Constitutional:       General: She is not in acute distress.  HENT:      Head: Normocephalic and atraumatic.   Eyes:      General: No scleral icterus.     Extraocular Movements: Extraocular movements intact.      Conjunctiva/sclera: Conjunctivae normal.      Pupils: Pupils are equal, round, and reactive to light.   Cardiovascular:      Rate and Rhythm: Normal rate and regular rhythm.   Pulmonary:      Effort: Pulmonary effort is normal. No respiratory distress.      Breath sounds: Normal breath sounds. No wheezing or rhonchi.   Abdominal:      Palpations: Abdomen is soft.      Tenderness: There is no abdominal tenderness.   Musculoskeletal:         General: No swelling.      Cervical back: Neck supple. No tenderness.    Lymphadenopathy:      Cervical: No cervical adenopathy.   Skin:     General: Skin is warm and dry.      Coloration: Skin is not jaundiced.   Neurological:      Mental Status: She is alert.   Psychiatric:         Mood and Affect: Mood normal.         Behavior: Behavior normal.         Thought Content: Thought content normal.         Judgment: Judgment normal.            Assessment & Plan  Patient Active Problem List   Diagnosis    Colon cancer screening    Gastroesophageal reflux disease       ICD-10-CM ICD-9-CM   1. Encounter for wellness examination  Z00.00 V70.0   2. Health care maintenance  Z00.00 V70.0   3. Encounter for screening mammogram for malignant neoplasm of breast  Z12.31 V76.12     Orders Placed This Encounter   Procedures    Mammo Screening Digital Tomosynthesis Bilateral With CAD     Standing Status:   Future     Standing Expiration Date:   12/6/2024     Order Specific Question:   Reason for Exam:     Answer:   screening for breast ca     Order Specific Question:   Release to patient     Answer:   Routine Release [[id number]]    CBC (No Diff)     Standing Status:   Future     Standing Expiration Date:   12/6/2024     Order Specific Question:   Release to patient     Answer:   Routine Release [[id number]]    Comprehensive Metabolic Panel     Standing Status:   Future     Standing Expiration Date:   12/6/2024     Order Specific Question:   Release to patient     Answer:   Routine Release [[id number]]    Hemoglobin A1c     Standing Status:   Future     Standing Expiration Date:   12/6/2024     Order Specific Question:   Release to patient     Answer:   Routine Release [[id number]]    Lipid Panel     Standing Status:   Future     Standing Expiration Date:   12/6/2024     Order Specific Question:   Release to patient     Answer:   Routine Release [[id number]]    TSH     Standing Status:   Future     Standing Expiration Date:   12/6/2024     Order Specific Question:   Release to patient     Answer:    Routine Release [[id number]]       Meds Ordered During Visit:  No orders of the defined types were placed in this encounter.    Immunizations were discussed with patient today.  She declined flu shot today.  She also prefers to hold on COVID booster at this time.    Age-appropriate screenings were discussed with patient today.  I reviewed her recent metabolic screenings with her.  Will plan to update mammogram soon.  She is up-to-date on colonoscopy.    I encouraged Lisseth to continue healthy eating habits and exercise for healthy weight loss.    Return in about 1 year (around 12/6/2024), or if symptoms worsen or fail to improve, for Recheck, Labs Prior.          Referring Provider (if known): No ref. provider found      This document has been electronically signed by Ana Fan DO  December 6, 2023 10:09 EST    Ana Fan DO, FACOI  [address] [place] [address]  [phone] (office)    Part of this note may be an electronic transcription/translation of spoken language to printed text using the Dragon Dictation System.  Answers submitted by the patient for this visit:  Primary Reason for Visit (Submitted on 12/6/2023)  What is the primary reason for your visit?: Physical

## 2023-12-20 ENCOUNTER — HOSPITAL ENCOUNTER (OUTPATIENT)
Dept: MAMMOGRAPHY | Facility: HOSPITAL | Age: 63
Discharge: HOME | End: 2023-12-20
Admitting: INTERNAL MEDICINE
Payer: COMMERCIAL

## 2023-12-20 DIAGNOSIS — Z12.31 ENCOUNTER FOR SCREENING MAMMOGRAM FOR MALIGNANT NEOPLASM OF BREAST: ICD-10-CM

## 2023-12-20 PROCEDURE — 77067 SCR MAMMO BI INCL CAD: CPT

## 2023-12-20 PROCEDURE — 77063 BREAST TOMOSYNTHESIS BI: CPT

## 2023-12-21 RX ORDER — CONJUGATED ESTROGENS 0.62 MG/G
CREAM VAGINAL
Qty: 30 G | Refills: 4 | Status: SHIPPED | OUTPATIENT
Start: 2023-12-21

## 2023-12-21 NOTE — TELEPHONE ENCOUNTER
Caller: Jose Maria Cota    Relationship: Self    Best call back number: [phone]     Requested Prescriptions:   Requested Prescriptions     Pending Prescriptions Disp Refills    Premarin 0.625 MG/GM vaginal cream 30 g 4     Sig: Use 0.5 grams intravaginally 2 times per week at bedtime.        Pharmacy where request should be sent: [facility] [address] [facility] [phone] [facility] [phone]      Last office visit with prescribing clinician: 12/6/2023   Last telemedicine visit with prescribing clinician: Visit date not found   Next office visit with prescribing clinician: 12/9/2024     Additional details provided by patient: THIS WAS PRESCRIBED BY ANOTHER DR WHO RETIRED.  JOSE MARIA WILL BE SEEING A NEW DR SOON AND NEEDS A 12MONTH SUPPLY TO HOLD HER OVER.    SHE HAS NONE LEFT.    Does the patient have less than a 3 day supply:  [x] Yes  [] No    Would you like a call back once the refill request has been completed: [x] Yes [] No    If the office needs to give you a call back, can they leave a voicemail: [] Yes [] No    Marquise Wilkerson Rep   12/21/23 13:06 EST

## 2024-02-26 DIAGNOSIS — K21.9 GASTROESOPHAGEAL REFLUX DISEASE, UNSPECIFIED WHETHER ESOPHAGITIS PRESENT: ICD-10-CM

## 2024-02-26 RX ORDER — ESOMEPRAZOLE MAGNESIUM 40 MG/1
CAPSULE, DELAYED RELEASE ORAL
Qty: 30 CAPSULE | Refills: 5 | Status: SHIPPED | OUTPATIENT
Start: 2024-02-26

## 2024-02-29 ENCOUNTER — OFFICE VISIT (OUTPATIENT)
Dept: FAMILY MEDICINE CLINIC | Facility: CLINIC | Age: 64
End: 2024-02-29
Payer: COMMERCIAL

## 2024-02-29 VITALS
HEIGHT: 65 IN | DIASTOLIC BLOOD PRESSURE: 70 MMHG | TEMPERATURE: 97.3 F | OXYGEN SATURATION: 97 % | BODY MASS INDEX: 30.99 KG/M2 | HEART RATE: 86 BPM | SYSTOLIC BLOOD PRESSURE: 110 MMHG | RESPIRATION RATE: 16 BRPM | WEIGHT: 186 LBS

## 2024-02-29 DIAGNOSIS — H61.21 IMPACTED CERUMEN OF RIGHT EAR: Primary | ICD-10-CM

## 2024-02-29 RX ORDER — ESTRADIOL 1 MG/1
TABLET ORAL
COMMUNITY

## 2024-02-29 NOTE — PROGRESS NOTES
Patient Name: Lisseth Cota Today's Date: 2024   Patient MRN / CSN: [id number] / [id number] Date of Encounter: 2024   Patient Age / : 63 y.o. / 1960 Encounter Provider: Ana Fan DO   Referring Physician: No ref. provider found          Lisseth is a 63 y.o. female who is being seen today for Earache      History of Present Illness    Lisseth presents today for an acute visit with complaints of her right ear feeling clogged.  She reports this has become more bothersome since symptom onset and she feels that it is affecting her hearing.      Allergies include:Azithromycin, Penicillins, and Sulfa antibiotics  Current Outpatient Medications   Medication Sig Dispense Refill    esomeprazole (nexIUM) 40 MG capsule TAKE ONE CAPSULE BY MOUTH IN THE MORNING BEFORE BREAKFAST 30 capsule 5    estradiol (ESTRACE) 1 MG tablet       Multiple Vitamin (MULTI-VITAMIN DAILY PO) Take  by mouth 2 (two) times a day.      Premarin 0.625 MG/GM vaginal cream Use 0.5 grams intravaginally 2 times per week at bedtime. 30 g 4     No current facility-administered medications for this visit.     Past Medical History:   Diagnosis Date    Endometriosis     Fibrocystic breast     Menopausal symptoms     S/P total abdominal hysterectomy     ovaries remain     Family History   Problem Relation Age of Onset    Heart disease Mother         by-pass    Other Mother         Lupus    Heart disease Father     Neuropathy Father     Hypertension Father     Breast cancer Neg Hx     Uterine cancer Neg Hx     Endometrial cancer Neg Hx     Ovarian cancer Neg Hx     Colon cancer Neg Hx      Past Surgical History:   Procedure Laterality Date    BLADDER SURGERY       SECTION       SECTION       SECTION      COLONOSCOPY N/A 12/15/2017    Procedure: COLONOSCOPY FOR SCREENING  CPTCODE:38633;  Surgeon: Bimal Hoang III, MD;  Location: [facility];  Service:     DILATATION AND CURETTAGE      JOINT REPLACEMENT  " 10/20/2017    Patial left kneee    KNEE SURGERY  10/25/2018    left knee    TOTAL ABDOMINAL HYSTERECTOMY  08/08/2011     Social History     Substance and Sexual Activity   Alcohol Use Not Currently    Comment: Occasional social drink     Social History     Tobacco Use   Smoking Status Never   Smokeless Tobacco Never     Social History     Substance and Sexual Activity   Drug Use Never     Review of Systems   Constitutional:  Negative for fever.   HENT:  Positive for ear pain and hearing loss. Negative for sore throat.         Depression Assessment Review:  PHQ-9 Total Score:    Vital Signs & Measurements Taken This Encounter  /70 (BP Location: Right arm, Patient Position: Sitting, Cuff Size: Adult)   Pulse 86   Temp 97.3 °F (36.3 °C) (Temporal)   Resp 16   Ht 165.1 cm (65\")   Wt 84.4 kg (186 lb)   SpO2 97%   BMI 30.95 kg/m²    SpO2 Percentage    02/29/24 1507   SpO2: 97%          Physical Exam  Vitals reviewed.   Constitutional:       General: She is not in acute distress.  HENT:      Head: Normocephalic and atraumatic.      Right Ear: There is impacted cerumen.      Left Ear: Tympanic membrane normal.   Eyes:      General: No scleral icterus.     Extraocular Movements: Extraocular movements intact.      Conjunctiva/sclera: Conjunctivae normal.      Pupils: Pupils are equal, round, and reactive to light.   Cardiovascular:      Rate and Rhythm: Normal rate.   Pulmonary:      Effort: Pulmonary effort is normal. No respiratory distress.   Skin:     General: Skin is warm and dry.      Coloration: Skin is not jaundiced.   Neurological:      Mental Status: She is alert.   Psychiatric:         Mood and Affect: Mood normal.         Behavior: Behavior normal.        Procedure   Ear Cerumen Removal    Date/Time: 2/29/2024 4:46 PM    Performed by: Ana Fan DO  Authorized by: Ana Fan DO  Consent: Verbal consent obtained.  Consent given by: patient  Patient understanding: patient states " understanding of the procedure being performed  Patient consent: the patient's understanding of the procedure matches consent given  Patient identity confirmed: provided demographic data and verbally with patient    Anesthesia:  Local Anesthetic: none  Location details: right ear  Patient tolerance: patient tolerated the procedure well with no immediate complications  Procedure type: (Ear lavage was performed by Joanna VALERO RN.  This was partially successful.  I then used a lighted curette to remove the remainder of the cerumen from the ear canal.)    Sedation:  Patient sedated: no                 Assessment & Plan  Patient Active Problem List   Diagnosis    Colon cancer screening    Gastroesophageal reflux disease       ICD-10-CM ICD-9-CM   1. Impacted cerumen of right ear  H61.21 380.4     Orders Placed This Encounter   Procedures    Ear Cerumen Removal     Order Specific Question:   Release to patient     Answer:   Routine Release [[id number]]       Meds Ordered During Visit:  No orders of the defined types were placed in this encounter.    Cerumen removal was performed as above.  Patient tolerated this well and felt much better after this was done.  Hearing improved after the cerumen was removed.    Return if symptoms worsen or fail to improve, for Next scheduled follow up.          Referring Provider (if known): No ref. provider found      This document has been electronically signed by Ana Fan DO  February 29, 2024 16:48 EST    Ana Fan DO, FACOI  [address] [place] [address] [phone] (office)    Part of this note may be an electronic transcription/translation of spoken language to printed text using the Dragon Dictation System.

## 2024-05-08 ENCOUNTER — TELEPHONE (OUTPATIENT)
Dept: FAMILY MEDICINE CLINIC | Facility: CLINIC | Age: 64
End: 2024-05-08
Payer: COMMERCIAL

## 2024-05-08 DIAGNOSIS — N76.1 CHRONIC VAGINITIS: Primary | ICD-10-CM

## 2024-05-08 RX ORDER — ESTRADIOL 0.1 MG/G
2 CREAM VAGINAL
Qty: 85 G | Refills: 12 | Status: SHIPPED | OUTPATIENT
Start: 2024-05-08

## 2024-10-18 ENCOUNTER — TREATMENT (OUTPATIENT)
Dept: PHYSICAL THERAPY | Facility: CLINIC | Age: 64
End: 2024-10-18
Payer: COMMERCIAL

## 2024-10-18 DIAGNOSIS — M18.11 ARTHRITIS OF CARPOMETACARPAL (CMC) JOINT OF RIGHT THUMB: Primary | ICD-10-CM

## 2024-10-18 DIAGNOSIS — Z96.698 S/P FINGER JOINT REPLACEMENT: ICD-10-CM

## 2024-10-18 DIAGNOSIS — Z47.89 ORTHOPEDIC AFTERCARE: ICD-10-CM

## 2024-10-18 NOTE — PROGRESS NOTES
Lisseth Cipriano 1960   Diagnosis/ Surgery: right s/p CMCJ athroplasty.               Date Of Injury: chronic     Date Of Surgery:10/8/24    Hand Dominance: right   History of Present Condition: progressive arthritis and pain with use.   Medical/Vocational History/ Medications:  [facility]     Pain: moderate     Edema: moderate   Sensibility: WNL   Wound Status: healing incision site   ROM/ Strength: NT     Splinting:  Patient was measure and fit with a custom fabricated forearm based thumb immobilization splint with IPJ free.    Patient was instructed in wearing schedule, precautions and care of the splint during this visit.   Patient was instructed in proper donning/doffing of splint.   Assessment:  Patient was fitted and appropriate splint was fabricated this date.  Patient reported that splint was comfortable and had no complications with the fit of the splint.  Patient was instructed and patient verbalized understanding of precautions, wear and care of the splint.   Patient demonstrated independent donning/doffing of splint during treatment today.  Goals:  Patient was fitted properly with appropriate splint for diagnosis  Patient was educated on precautions, wear schedule and care of splint  Patient demonstrated independence with donning/doffing of the splint.  Splint was provided to Protect Healing Structures, Restrict Mobility, Improve joint alignment.  Plan:  No additional treatment is required for this patient at this time. The patient is therefore discharged from therapy.  Patient advised to contact therapist with any additional questions or concerns regarding the fit and function of the splint.  Patient will be seen for splint issues as needed   Wear Instructions: Off for hygiene and light wrist AROM          PT SIGNATURE: Stephania Frias PT   DATE TREATMENT INITIATED: 10/18/2024    Initial Certification  Certification Period: 1/16/2025  I certify that the therapy  services are furnished while this patient is under my care.  The services outlined above are required by this patient, and will be reviewed every 90 days.     PHYSICIAN:       DATE:     Please sign and return via fax to [phone].. Thank you, [facility].

## 2024-10-23 ENCOUNTER — TRANSCRIBE ORDERS (OUTPATIENT)
Dept: PHYSICAL THERAPY | Facility: CLINIC | Age: 64
End: 2024-10-23
Payer: COMMERCIAL

## 2024-10-23 DIAGNOSIS — Z96.698: Primary | ICD-10-CM

## 2024-11-18 ENCOUNTER — HOSPITAL ENCOUNTER (OUTPATIENT)
Dept: OCCUPATIONAL THERAPY | Facility: HOSPITAL | Age: 64
Discharge: HOME | End: 2024-11-18
Admitting: PHYSICIAN ASSISTANT
Payer: COMMERCIAL

## 2024-11-18 ENCOUNTER — TRANSCRIBE ORDERS (OUTPATIENT)
Dept: PHYSICAL THERAPY | Facility: HOSPITAL | Age: 64
End: 2024-11-18
Payer: COMMERCIAL

## 2024-11-18 DIAGNOSIS — Z47.1 AFTERCARE FOLLOWING RIGHT FINGER JOINT REPLACEMENT SURGERY: Primary | ICD-10-CM

## 2024-11-18 DIAGNOSIS — Z96.691 AFTERCARE FOLLOWING RIGHT FINGER JOINT REPLACEMENT SURGERY: Primary | ICD-10-CM

## 2024-11-18 PROCEDURE — 97166 OT EVAL MOD COMPLEX 45 MIN: CPT | Performed by: OCCUPATIONAL THERAPIST

## 2024-11-18 PROCEDURE — 97110 THERAPEUTIC EXERCISES: CPT | Performed by: OCCUPATIONAL THERAPIST

## 2024-11-18 NOTE — PROGRESS NOTES
Outpatient Occupational Therapy Ortho Initial Evaluation    Patient: Lisseth Cota   : 1960  Diagnosis/ICD-10 Code:  [unfilled]  Referring practitioner: No Known Provider  Date of Initial Visit: 2024  Today's Date: 2024  Patient seen for 2 sessions             Patient Active Problem List   Diagnosis    Ventricular arrhythmia    Routine adult health maintenance    Diffuse large B cell lymphoma (CMS/HCC)    PVC's (premature ventricular contractions)    Flying phobia    Localized osteoporosis with current pathological fracture with routine healing    Subclinical hypothyroidism    Menopause    Closed compression fracture of thoracic vertebra (CMS/HCC)    S/P kyphoplasty                    Subjective Evaluation    History of Present Illness  Mechanism of injury: Patient reports surgery for right thumb s/p trapeziectomy with thumb MC uspensionplasty on . Patient reports history of arthiritis prior.. patient reports wearing wrist thumb splint intially but splint is discontinued at this time except for PRN. Patient reports wearing occasionally  for protection of thumb. Patient states she wants to improve strength and ROM.    Pain  Current pain ratin  At best pain ratin  At worst pain ratin  Aggravating factors: repetitive movement    Hand dominance: right    Patient Goals  Patient goals for therapy: increased strength, decreased pain and increased motion             Objective          Observations     Additional Wrist/Hand Observation Details  Incision area noted base right thumb. Scar has apeared to have healed well. Min sensitivity noted     Active Range of Motion     Right Wrist   Wrist flexion: 70 degrees   Wrist extension: 60 degrees     Right Thumb   Flexion     MP: 27 degrees    DIP: 20 degrees  Opposition: Grossly intact    Strength/Myotome Testing     Left Wrist/Hand      (2nd hand position)     Trial 1: 55 lbs    Thumb Strength  Key/Lateral Pinch     Trial 1: 14  "lbs    Right Wrist/Hand      (2nd hand position)     Trial 1: 22 lbs    Thumb Strength   Key/Lateral Pinch     Trial 1: 1 lbs    Tests     Additional Tests Details  GMC with box and blocks right 60 left 71    FMC  with 9 hole peg test   right :17  left :16  Patiet reports \"awkwardness\" with right hand present    Swelling     Right Wrist/Hand     Additional Swelling Details  Minimal edema noted right thumb area          Assessment & Plan       Assessment  Impairments: abnormal coordination, abnormal or restricted ROM, activity intolerance, impaired physical strength, lacks appropriate home exercise program and pain with function   Prognosis: good    Goals  Plan Goals: STG  1.  Patient will be independent with right ROM HEP.  2.  Patient will improve right thumb ROM 3-5 degrees  3. Patient will improve right  hand strength 3 -5 lb   4. Patient will report decrease pain to 4 or below with use.    LTG  1. Patient will be independent with HEP provided.  2. Patient will improve right thumb ROM to WFL  3. Patient will increase right strength for  and pinch strength.  4. Patient will increase G/FMC right UE.  5. Patient will report decrease pain with use right UE.    Plan  Therapy options: will be seen for skilled therapy services  Planned modality interventions: thermotherapy (hydrocollator packs) and thermotherapy (paraffin bath)  Planned therapy interventions: motor coordination training, neuromuscular re-education, strengthening, stretching, flexibility, functional ROM exercises and fine motor coordination training  Frequency: 2x week  Duration in visits: 8  Duration in weeks: 4  Treatment plan discussed with: patient  Plan details: OT as planned.        Timed Codes        Manual Therapy:         mins  85156;    Therapeutic Exercise:    10     mins  45786;     Neuromuscular Chuy:        mins  21032;    Therapeutic Activity:          mins  93198;      Ultrasound:          mins  80702;    Community/ work       "   mins  78836  Self Care/ Rosy         mins  14257  Sensory Re-Int.                  mins   74017  Cognitve Re-train         mins  80769     Un-timed Codes  Electrical Stimulation:         mins 9 51094 ( );  OT eval                         30 min        00878      Timed Treatment:   10   mins   Total Treatment:      40  mins    OT SIGNATURE: Maggie Darby, OT License [id number]  DATE TREATMENT INITIATED: 11/18/2024    Initial Certification  Certification Period: 2/16/2025  I certify that the therapy services are furnished while this patient is under my care.  The services outlined above are required by this patient, and will be reviewed every 90 days.     PHYSICIAN: Provider, No Known      DATE:     Please sign and return via fax to [phone].. Thank you, [facility] Occupational Therapy.

## 2024-11-20 ENCOUNTER — HOSPITAL ENCOUNTER (OUTPATIENT)
Dept: OCCUPATIONAL THERAPY | Facility: HOSPITAL | Age: 64
Discharge: HOME | End: 2024-11-20
Admitting: PHYSICIAN ASSISTANT
Payer: COMMERCIAL

## 2024-11-20 DIAGNOSIS — M79.644 THUMB PAIN, RIGHT: Primary | ICD-10-CM

## 2024-11-20 PROCEDURE — 97530 THERAPEUTIC ACTIVITIES: CPT | Performed by: OCCUPATIONAL THERAPIST

## 2024-11-20 PROCEDURE — 97110 THERAPEUTIC EXERCISES: CPT | Performed by: OCCUPATIONAL THERAPIST

## 2024-11-20 NOTE — PROGRESS NOTES
Occupational Therapy Daily Treatment Note      Patient: Lisseth Cota   : 1960  Referring practitioner: JEFF Hoover  Date of Initial Visit: Type: THERAPY  Episode: s/p right thumb CMCJ arthroplasty   Today's Date: 2024  Patient seen for 3 sessions       Visit Diagnoses:    ICD-10-CM ICD-9-CM   1. Thumb pain, right  M79.644 729.5       Subjective patient agreeable to therapy. Patient reports HEP  is going well, no concerns voiced.    Objective   See Exercise, Manual, and Modality Logs for complete treatment.       Assessment/Plan patient complete A/PROM exercised for right thumb, gentle strengthening intiated with pink foam, yellow band. Patient demonstrating improved ROM right thumb.  Patient tolerated well with no complaints. Continue  OT      Timed:         Manual Therapy:         mins  83348;     Therapeutic Exercise:    25     mins  80810;     Neuromuscular Chuy:        mins  35649;    Therapeutic Activity:    15      mins  17294;     Gait Training:           mins  98750;     Ultrasound:          mins  26501;    Ionto                                   mins   36452  Self Care                            mins   30559      Un-Timed:  Electrical Stimulation:         mins  47269 ( );  Dry Needling          mins self-pay  Traction          mins 99563  Canalith Repos         mins 14128    Timed Treatment:  40    mins   Total Treatment:      50  mins    Maggie Darby, OT  [place] License: [id number]

## 2024-11-26 ENCOUNTER — HOSPITAL ENCOUNTER (OUTPATIENT)
Dept: OCCUPATIONAL THERAPY | Facility: HOSPITAL | Age: 64
Discharge: HOME | End: 2024-11-26
Admitting: PHYSICIAN ASSISTANT
Payer: COMMERCIAL

## 2024-11-26 DIAGNOSIS — Z47.1 AFTERCARE FOLLOWING RIGHT FINGER JOINT REPLACEMENT SURGERY: ICD-10-CM

## 2024-11-26 DIAGNOSIS — M79.644 THUMB PAIN, RIGHT: Primary | ICD-10-CM

## 2024-11-26 DIAGNOSIS — Z96.691 AFTERCARE FOLLOWING RIGHT FINGER JOINT REPLACEMENT SURGERY: ICD-10-CM

## 2024-11-26 PROCEDURE — 97110 THERAPEUTIC EXERCISES: CPT | Performed by: OCCUPATIONAL THERAPIST

## 2024-11-26 PROCEDURE — 97530 THERAPEUTIC ACTIVITIES: CPT | Performed by: OCCUPATIONAL THERAPIST

## 2024-11-26 NOTE — PROGRESS NOTES
Occupational Therapy Daily Treatment Note      Patient: Lisseth Cota   : 1960  Referring practitioner: JEFF Hoover  Date of Initial Visit: Type: THERAPY  Episode: s/p right thumb CMCJ arthroplasty   Today's Date: 2024  Patient seen for 4 sessions       Visit Diagnoses:    ICD-10-CM ICD-9-CM   1. Thumb pain, right  M79.644 729.5   2. Aftercare following right finger joint replacement surgery  Z47.1 V54.81    Z96.691 V43.69       Subjective patient agreeable to therapy. Patient states she feels her thumb is moving somewhat better. Patient continues with HEP.    Objective   See Exercise, Manual, and Modality Logs for complete treatment.       Assessment/Plan patient completed ROM, gentle strengthening and coordination activities with left hand and thumb today. Patient emerging with increasing left hand use. Patient tolerated well, continue OT as planned.      Timed:         Manual Therapy:         mins  59429;     Therapeutic Exercise:    25     mins  91174;     Neuromuscular Chuy:        mins  77774;    Therapeutic Activity:    15      mins  06350;     Gait Training:           mins  04303;     Ultrasound:          mins  91774;    Ionto                                   mins   30692  Self Care                            mins   25747      Un-Timed:  Electrical Stimulation:         mins  88168 ( );  Dry Needling          mins self-pay  Traction          mins 39047  Canalith Repos         mins 02123    Timed Treatment: 40   mins   Total Treatment:    45    mins    Maggie Darby OT  [place] License: [id number]

## 2024-12-02 ENCOUNTER — HOSPITAL ENCOUNTER (OUTPATIENT)
Dept: OCCUPATIONAL THERAPY | Facility: HOSPITAL | Age: 64
Discharge: HOME | End: 2024-12-02
Admitting: PHYSICIAN ASSISTANT
Payer: COMMERCIAL

## 2024-12-02 DIAGNOSIS — M79.644 THUMB PAIN, RIGHT: Primary | ICD-10-CM

## 2024-12-02 DIAGNOSIS — Z96.691 AFTERCARE FOLLOWING RIGHT FINGER JOINT REPLACEMENT SURGERY: ICD-10-CM

## 2024-12-02 DIAGNOSIS — Z47.1 AFTERCARE FOLLOWING RIGHT FINGER JOINT REPLACEMENT SURGERY: ICD-10-CM

## 2024-12-02 PROCEDURE — 97530 THERAPEUTIC ACTIVITIES: CPT | Performed by: OCCUPATIONAL THERAPIST

## 2024-12-02 PROCEDURE — 97110 THERAPEUTIC EXERCISES: CPT | Performed by: OCCUPATIONAL THERAPIST

## 2024-12-02 NOTE — PROGRESS NOTES
Occupational Therapy Daily Treatment Note      Patient: Lisseth Ctoa   : 1960  Referring practitioner: JEFF Hoover  Date of Initial Visit: Type: THERAPY  Episode: s/p right thumb CMCJ arthroplasty   Today's Date: 2024  Patient seen for 5 sessions       Visit Diagnoses:    ICD-10-CM ICD-9-CM   1. Thumb pain, right  M79.644 729.5   2. Aftercare following right finger joint replacement surgery  Z47.1 V54.81    Z96.691 V43.69       Subjective patient agreeable to therapy. Patient with no new complaints.    Objective   See Exercise, Manual, and Modality Logs for complete treatment.       Assessment/Plan  Patient continues to tolerate therapy well. Patient continues to complete ROM and strength activities for left hand. Patient emerging in functional use. Continue OT as planned     Timed:         Manual Therapy:         mins  37498;     Therapeutic Exercise:      30   mins  99041;     Neuromuscular Chuy:        mins  02740;    Therapeutic Activity:    15      mins  44961;     Gait Training:           mins  31045;     Ultrasound:          mins  81673;    Ionto                                   mins   72440  Self Care                            mins   42817      Un-Timed:  Electrical Stimulation:         mins  45607 ( );  Dry Needling          mins self-pay  Traction          mins 89761  Canalith Repos         mins 05962    Timed Treatment:   40   mins   Total Treatment:    45    mins    Maggie Darby OT  [place] License: [id number]

## 2024-12-03 ENCOUNTER — CLINICAL SUPPORT (OUTPATIENT)
Dept: FAMILY MEDICINE CLINIC | Facility: CLINIC | Age: 64
End: 2024-12-03
Payer: COMMERCIAL

## 2024-12-03 DIAGNOSIS — Z00.00 ENCOUNTER FOR WELLNESS EXAMINATION: ICD-10-CM

## 2024-12-03 DIAGNOSIS — Z00.00 HEALTH CARE MAINTENANCE: ICD-10-CM

## 2024-12-03 LAB
ALBUMIN SERPL-MCNC: 3.8 G/DL (ref 3.5–5.2)
ALBUMIN/GLOB SERPL: 1.1 G/DL
ALP SERPL-CCNC: 85 U/L (ref 39–117)
ALT SERPL W P-5'-P-CCNC: 18 U/L (ref 1–33)
ANION GAP SERPL CALCULATED.3IONS-SCNC: 10.8 MMOL/L (ref 5–15)
AST SERPL-CCNC: 15 U/L (ref 1–32)
BILIRUB SERPL-MCNC: 0.4 MG/DL (ref 0–1.2)
BUN SERPL-MCNC: 20 MG/DL (ref 8–23)
BUN/CREAT SERPL: 26 (ref 7–25)
CALCIUM SPEC-SCNC: 9.2 MG/DL (ref 8.6–10.5)
CHLORIDE SERPL-SCNC: 103 MMOL/L (ref 98–107)
CHOLEST SERPL-MCNC: 177 MG/DL (ref 0–200)
CO2 SERPL-SCNC: 23.2 MMOL/L (ref 22–29)
CREAT SERPL-MCNC: 0.77 MG/DL (ref 0.57–1)
DEPRECATED RDW RBC AUTO: 39.4 FL (ref 37–54)
EGFRCR SERPLBLD CKD-EPI 2021: 87.3 ML/MIN/1.73
ERYTHROCYTE [DISTWIDTH] IN BLOOD BY AUTOMATED COUNT: 11.7 % (ref 12.3–15.4)
GLOBULIN UR ELPH-MCNC: 3.6 GM/DL
GLUCOSE SERPL-MCNC: 110 MG/DL (ref 65–99)
HBA1C MFR BLD: 5.4 % (ref 4.8–5.6)
HCT VFR BLD AUTO: 42.9 % (ref 34–46.6)
HDLC SERPL-MCNC: 40 MG/DL (ref 40–60)
HGB BLD-MCNC: 14 G/DL (ref 12–15.9)
LDLC SERPL CALC-MCNC: 124 MG/DL (ref 0–100)
LDLC/HDLC SERPL: 3.07 {RATIO}
MCH RBC QN AUTO: 30.2 PG (ref 26.6–33)
MCHC RBC AUTO-ENTMCNC: 32.6 G/DL (ref 31.5–35.7)
MCV RBC AUTO: 92.7 FL (ref 79–97)
PLATELET # BLD AUTO: 286 10*3/MM3 (ref 140–450)
PMV BLD AUTO: 10.4 FL (ref 6–12)
POTASSIUM SERPL-SCNC: 4.1 MMOL/L (ref 3.5–5.2)
PROT SERPL-MCNC: 7.4 G/DL (ref 6–8.5)
RBC # BLD AUTO: 4.63 10*6/MM3 (ref 3.77–5.28)
SODIUM SERPL-SCNC: 137 MMOL/L (ref 136–145)
TRIGL SERPL-MCNC: 71 MG/DL (ref 0–150)
TSH SERPL DL<=0.05 MIU/L-ACNC: 1.74 UIU/ML (ref 0.27–4.2)
VLDLC SERPL-MCNC: 13 MG/DL (ref 5–40)
WBC NRBC COR # BLD AUTO: 5.57 10*3/MM3 (ref 3.4–10.8)

## 2024-12-03 PROCEDURE — 83036 HEMOGLOBIN GLYCOSYLATED A1C: CPT | Performed by: INTERNAL MEDICINE

## 2024-12-03 PROCEDURE — 36415 COLL VENOUS BLD VENIPUNCTURE: CPT | Performed by: INTERNAL MEDICINE

## 2024-12-03 PROCEDURE — 80050 GENERAL HEALTH PANEL: CPT | Performed by: INTERNAL MEDICINE

## 2024-12-03 PROCEDURE — 80061 LIPID PANEL: CPT | Performed by: INTERNAL MEDICINE

## 2024-12-03 NOTE — PROGRESS NOTES
Venipuncture Blood Specimen Collection  Venipuncture performed in left arm by Joycelyn Etienne MA with good hemostasis. Patient tolerated the procedure well without complications.   12/03/24   Joycelyn Etienne MA

## 2024-12-05 ENCOUNTER — HOSPITAL ENCOUNTER (OUTPATIENT)
Dept: OCCUPATIONAL THERAPY | Facility: HOSPITAL | Age: 64
Discharge: HOME | End: 2024-12-05
Admitting: PHYSICIAN ASSISTANT
Payer: COMMERCIAL

## 2024-12-05 DIAGNOSIS — Z96.691 AFTERCARE FOLLOWING RIGHT FINGER JOINT REPLACEMENT SURGERY: ICD-10-CM

## 2024-12-05 DIAGNOSIS — M79.644 THUMB PAIN, RIGHT: Primary | ICD-10-CM

## 2024-12-05 DIAGNOSIS — Z47.1 AFTERCARE FOLLOWING RIGHT FINGER JOINT REPLACEMENT SURGERY: ICD-10-CM

## 2024-12-05 PROCEDURE — 97530 THERAPEUTIC ACTIVITIES: CPT | Performed by: OCCUPATIONAL THERAPIST

## 2024-12-05 PROCEDURE — 97110 THERAPEUTIC EXERCISES: CPT | Performed by: OCCUPATIONAL THERAPIST

## 2024-12-05 NOTE — PROGRESS NOTES
Occupational Therapy Daily Treatment Note      Patient: Lisseth Cota   : 1960  Referring practitioner: JEFF Hoover  Date of Initial Visit: Type: THERAPY  Episode: s/p right thumb CMCJ arthroplasty   Today's Date: 2024  Patient seen for 6 sessions       Visit Diagnoses:    ICD-10-CM ICD-9-CM   1. Thumb pain, right  M79.644 729.5   2. Aftercare following right finger joint replacement surgery  Z47.1 V54.81    Z96.691 V43.69       Subjective patient is agreeable to therapy. Patient with no new complaints today.    Objective   See Exercise, Manual, and Modality Logs for complete treatment.       Assessment/Plan patient tolerated therapy  well with no complaints. Patient  continues to complete ex and activities to increase strength, functional use of  her right hand. Patient emerging in all area. Continue OT as planned.      Timed:         Manual Therapy:         mins  53727;     Therapeutic Exercise:    30     mins  61566;     Neuromuscular Chuy:        mins  99928;    Therapeutic Activity:     15     mins  06275;     Gait Training:           mins  96728;     Ultrasound:          mins  80376;    Ionto                                   mins   62266  Self Care                            mins   73055      Un-Timed:  Electrical Stimulation:         mins  19253 ( );  Dry Needling          mins self-pay  Traction          mins 82842  Canalith Repos         mins 43048    Timed Treatment:   45   mins   Total Treatment:    55   mins    Maggie Darby OT  [place] License:[id number]

## 2024-12-09 ENCOUNTER — OFFICE VISIT (OUTPATIENT)
Dept: FAMILY MEDICINE CLINIC | Facility: CLINIC | Age: 64
End: 2024-12-09
Payer: COMMERCIAL

## 2024-12-09 ENCOUNTER — HOSPITAL ENCOUNTER (OUTPATIENT)
Dept: OCCUPATIONAL THERAPY | Facility: HOSPITAL | Age: 64
Discharge: HOME | End: 2024-12-09
Admitting: PHYSICIAN ASSISTANT
Payer: COMMERCIAL

## 2024-12-09 VITALS
HEIGHT: 65 IN | HEART RATE: 66 BPM | BODY MASS INDEX: 31.06 KG/M2 | OXYGEN SATURATION: 99 % | SYSTOLIC BLOOD PRESSURE: 120 MMHG | TEMPERATURE: 97.9 F | WEIGHT: 186.4 LBS | DIASTOLIC BLOOD PRESSURE: 70 MMHG

## 2024-12-09 DIAGNOSIS — Z47.1 AFTERCARE FOLLOWING RIGHT FINGER JOINT REPLACEMENT SURGERY: ICD-10-CM

## 2024-12-09 DIAGNOSIS — Z96.691 AFTERCARE FOLLOWING RIGHT FINGER JOINT REPLACEMENT SURGERY: ICD-10-CM

## 2024-12-09 DIAGNOSIS — M79.644 THUMB PAIN, RIGHT: Primary | ICD-10-CM

## 2024-12-09 DIAGNOSIS — Z00.00 HEALTH CARE MAINTENANCE: ICD-10-CM

## 2024-12-09 DIAGNOSIS — Z12.31 ENCOUNTER FOR SCREENING MAMMOGRAM FOR MALIGNANT NEOPLASM OF BREAST: ICD-10-CM

## 2024-12-09 DIAGNOSIS — Z00.00 ENCOUNTER FOR WELLNESS EXAMINATION: Primary | ICD-10-CM

## 2024-12-09 PROCEDURE — 99396 PREV VISIT EST AGE 40-64: CPT | Performed by: INTERNAL MEDICINE

## 2024-12-09 PROCEDURE — 97530 THERAPEUTIC ACTIVITIES: CPT | Performed by: OCCUPATIONAL THERAPIST

## 2024-12-09 PROCEDURE — 97110 THERAPEUTIC EXERCISES: CPT | Performed by: OCCUPATIONAL THERAPIST

## 2024-12-09 NOTE — PROGRESS NOTES
Patient Name: Lisseth Cota Today's Date: 2024   Patient MRN / CSN: [id number] / [id number] Date of Encounter: 2024   Patient Age / : 64 y.o. / 1960 Encounter Provider: Ana Fan DO   Referring Physician: No ref. provider found          Lisseth is a 64 y.o. female who is being seen today for Annual Exam (Doing good. She is sore every day. No other issues. )      History of Present Illness    Pt presents for  exam today. Pt reports doing well.      Healthy Diet: yes  Exercise: yes  Regular Eye Exam: up to date  Regular Dental Exam: planning to schedule soon  Hearing Loss: left ear decreased   Immunizations: declined flu, encouraged shingrix  Mammogram: due this month, will order  Pap: planning to schedule soon  Colonoscopy: , repeat in 10 yrs    Allergies include:Azithromycin, Penicillins, and Sulfa antibiotics  Current Outpatient Medications   Medication Sig Dispense Refill    esomeprazole (nexIUM) 40 MG capsule TAKE ONE CAPSULE BY MOUTH IN THE MORNING BEFORE BREAKFAST (Patient taking differently: 1 capsule Every Morning Before Breakfast. PRN) 30 capsule 5    estradiol (ESTRACE VAGINAL) 0.1 MG/GM vaginal cream Insert 2 g into the vagina 3 (Three) Times a Week if Needed (vagnitis). 85 g 12    Multiple Vitamin (MULTI-VITAMIN DAILY PO) Take  by mouth 2 (two) times a day.       No current facility-administered medications for this visit.     Past Medical History:   Diagnosis Date    Endometriosis     Fibrocystic breast     Menopausal symptoms     S/P total abdominal hysterectomy     ovaries remain     Family History   Problem Relation Age of Onset    Heart disease Mother         by-pass    Other Mother         Lupus    Heart disease Father     Neuropathy Father     Hypertension Father     Breast cancer Neg Hx     Uterine cancer Neg Hx     Endometrial cancer Neg Hx     Ovarian cancer Neg Hx     Colon cancer Neg Hx      Past Surgical History:   Procedure Laterality Date    BLADDER SURGERY   "     SECTION       SECTION       SECTION      COLONOSCOPY N/A 12/15/2017    Procedure: COLONOSCOPY FOR SCREENING  CPTCODE:92968;  Surgeon: Bimal Hoang III, MD;  [place] [facility] [place]  Service:     DILATATION AND CURETTAGE      HAND SURGERY Right     JOINT REPLACEMENT  10/20/2017    Patial left kneee    KNEE SURGERY  10/25/2018    left knee    TOTAL ABDOMINAL HYSTERECTOMY  2011     Social History     Substance and Sexual Activity   Alcohol Use Not Currently    Comment: Occasional social drink     Social History     Tobacco Use   Smoking Status Never   Smokeless Tobacco Never     Social History     Substance and Sexual Activity   Drug Use Never     Review of Systems   Respiratory:  Negative for shortness of breath.    Cardiovascular:  Negative for chest pain.   Gastrointestinal:  Negative for abdominal pain and blood in stool.   Genitourinary:  Negative for hematuria.        Depression Assessment Review:  PHQ-9 Total Score:    Vital Signs & Measurements Taken This Encounter  /70 (BP Location: Left arm, Patient Position: Sitting, Cuff Size: Adult)   Pulse 66   Temp 97.9 °F (36.6 °C) (Oral)   Ht 165.1 cm (65\")   Wt 84.6 kg (186 lb 6.4 oz)   SpO2 99%   BMI 31.02 kg/m²    SpO2 Percentage    24 0817   SpO2: 99%               Physical Exam  Vitals reviewed.   Constitutional:       General: She is not in acute distress.  HENT:      Head: Normocephalic and atraumatic.      Right Ear: Tympanic membrane normal.      Left Ear: Tympanic membrane normal.   Eyes:      General: No scleral icterus.     Extraocular Movements: Extraocular movements intact.      Conjunctiva/sclera: Conjunctivae normal.      Pupils: Pupils are equal, round, and reactive to light.   Cardiovascular:      Rate and Rhythm: Normal rate and regular rhythm.   Pulmonary:      Effort: Pulmonary effort is normal. No respiratory distress.      Breath sounds: No wheezing or rhonchi. "   Musculoskeletal:         General: No swelling.      Cervical back: Neck supple. No tenderness.   Lymphadenopathy:      Cervical: No cervical adenopathy.   Skin:     General: Skin is warm and dry.      Coloration: Skin is not jaundiced.   Neurological:      Mental Status: She is alert.   Psychiatric:         Mood and Affect: Mood normal.         Behavior: Behavior normal.         Thought Content: Thought content normal.         Judgment: Judgment normal.           Assessment & Plan  Patient Active Problem List   Diagnosis    Colon cancer screening    Gastroesophageal reflux disease       ICD-10-CM ICD-9-CM   1. Encounter for wellness examination  Z00.00 V70.0   2. Health care maintenance  Z00.00 V70.0   3. Encounter for screening mammogram for malignant neoplasm of breast  Z12.31 V76.12     Diagnoses and all orders for this visit:    1. Encounter for wellness examination (Primary)  -     CBC (No Diff); Future  -     Comprehensive Metabolic Panel; Future  -     Hemoglobin A1c; Future  -     Lipid Panel; Future  -     TSH; Future    2. Health care maintenance  -     CBC (No Diff); Future  -     Comprehensive Metabolic Panel; Future  -     Hemoglobin A1c; Future  -     Lipid Panel; Future  -     TSH; Future    3. Encounter for screening mammogram for malignant neoplasm of breast  -     Mammo Screening Digital Tomosynthesis Bilateral With CAD; Future         Meds Ordered During Visit:  No orders of the defined types were placed in this encounter.    Immunizations were discussed with patient today. She declines flu shot. She plans to update Shingrix soon.    Age-appropriate screenings were discussed with patient today.  I reviewed her metabolic screenings with her in detail today.  We will plan to update mammogram soon.  She is up-to-date on colonoscopy.  I encouraged her to update Pap soon as well.    I encouraged Lisseth to continue healthy diet and exercise.  I also encouraged her to do a trial of vitamin D 800  international units daily over-the-counter to see if this helps with her arthralgias.    Return in about 1 year (around 12/9/2025), or if symptoms worsen or fail to improve, for Recheck, Labs Prior.          Referring Provider (if known): No ref. provider found      This document has been electronically signed by Ana Fan DO  December 9, 2024 09:21 EST    Ana Fan DO, FACOI  [address] [place] [address] [phone] (office)    Part of this note may be an electronic transcription/translation of spoken language to printed text using the Dragon Dictation System.

## 2024-12-09 NOTE — PROGRESS NOTES
Occupational Therapy Daily Treatment Note      Patient: Lisseth Cota   : 1960  Referring practitioner: JEFF Hoover  Date of Initial Visit: Type: THERAPY  Episode: s/p right thumb CMCJ arthroplasty   Today's Date: 2024  Patient seen for 7 sessions       Visit Diagnoses:    ICD-10-CM ICD-9-CM   1. Thumb pain, right  M79.644 729.5   2. Aftercare following right finger joint replacement surgery  Z47.1 V54.81    Z96.691 V43.69       Subjective Patient agreeable to therapy. Patient states her hand and thumb are improving.       Objective   See Exercise, Manual, and Modality Logs for complete treatment.       Assessment/Plan patient tolerated therapy well. Patient continues to complete ex and functional activity to increase strength and rom right hand and thumb. Patient emerging in all areas. Continue OT as planned.       Timed:         Manual Therapy:         mins  28642;     Therapeutic Exercise:    15     mins  54450;     Neuromuscular Chuy:        mins  53775;    Therapeutic Activity:   30       mins  30024;     Gait Training:           mins  52314;     Ultrasound:          mins  29048;    Ionto                                   mins   97990  Self Care                            mins   13813      Un-Timed:  Electrical Stimulation:         mins  77787 ( );  Dry Needling          mins self-pay  Traction          mins 35644  Canalith Repos         mins 79986    Timed Treatment:   45   mins   Total Treatment:    50    mins    Maggie Darby OT  [place] License: [id number]

## 2024-12-12 ENCOUNTER — HOSPITAL ENCOUNTER (OUTPATIENT)
Dept: OCCUPATIONAL THERAPY | Facility: HOSPITAL | Age: 64
Setting detail: THERAPIES SERIES
Discharge: HOME | End: 2024-12-12
Payer: COMMERCIAL

## 2024-12-12 DIAGNOSIS — Z47.1 AFTERCARE FOLLOWING RIGHT FINGER JOINT REPLACEMENT SURGERY: ICD-10-CM

## 2024-12-12 DIAGNOSIS — Z96.691 AFTERCARE FOLLOWING RIGHT FINGER JOINT REPLACEMENT SURGERY: ICD-10-CM

## 2024-12-12 DIAGNOSIS — M79.644 THUMB PAIN, RIGHT: Primary | ICD-10-CM

## 2024-12-12 PROCEDURE — 97530 THERAPEUTIC ACTIVITIES: CPT | Performed by: OCCUPATIONAL THERAPIST

## 2024-12-12 PROCEDURE — 97110 THERAPEUTIC EXERCISES: CPT | Performed by: OCCUPATIONAL THERAPIST

## 2024-12-12 NOTE — PROGRESS NOTES
E-prescription confirmation received from pharmacy. Voicemail left for patient.   Event Note Medication refill information reviewed.     Due date for traMADol (ULTRAM-ER) 200 MG 24 hr tablet is 4/2/19     Prescriptions prepped for review.     Will route to provider.     E-prescribe to [facility] pharmacy    Van Oquendo, RN  Care Coordinator   [facility]          Occupational  Therapy Daily Treatment Note      Patient: Lisseth Cota   : 1960  Referring practitioner: JEFF Hoover  Date of Initial Visit: Type: THERAPY  Episode: s/p right thumb CMCJ arthroplasty   Today's Date: 2024  Patient seen for 8 sessions       Visit Diagnoses:    ICD-10-CM ICD-9-CM   1. Thumb pain, right  M79.644 729.5   2. Aftercare following right finger joint replacement surgery  Z47.1 V54.81    Z96.691 V43.69       Subjective patient agreeable to therapy. Patient states her hand and thumb continue to improve with some pain/soreness noted at night .    Objective   See Exercise, Manual, and Modality Logs for complete treatment.       Assessment/Plan Patient tolerated therapy well. Patient continues to complete ex and activities to increase ROM and strength right hand. Patient tolerating therapy well and emerging with increased functional use. Continue OT as planned.      Timed:         Manual Therapy:         mins  67158;     Therapeutic Exercise:   30      mins  15562;     Neuromuscular Chuy:        mins  35793;    Therapeutic Activity:     15     mins  48802;     Gait Training:           mins  98778;     Ultrasound:          mins  18292;    Ionto                                   mins   89016  Self Care                            mins   58692      Un-Timed:  Electrical Stimulation:         mins  26111 ( );  Dry Needling          mins self-pay  Traction          mins 62127  Canalith Repos         mins 33911    Timed Treatment:   45   mins   Total Treatment:     55   mins    Maggie Darby OT  [place] License: [id number]                     Received call from patient requesting refill(s) of traMADol (ULTRAM-ER) 200 MG 24 hr tablet    Last picked up from pharmacy on 03/01/19    Pt last seen by prescribing provider on 02/06/19, has had injection since  Next appt scheduled for 04/17/19     checked in the past 6 months? Yes If no, print current report and give to RN    Last urine drug screen date 03/05/19  Current opioid agreement on file (completed within the last year) Yes Date of opioid agreement: 03/05/19    Processing (pick one and delete the others):      E-prescribe to [facility] pharmacy      Will route to nursing pool for review and preparation of prescription(s).        Signed Prescriptions:                        Disp   Refills    traMADol (ULTRAM-ER) 200 MG 24 hr tablet   30 tab*0        Sig: Take 1 ta fill 4/1/19 to start 4/2/2019  Authorizing Provider: MELANIE BENSON    Reviewed [place]  April 2, 2019- no concerning fills.    E-prescribed.  Please notify patient of  date 4/2/19 and START date 4/2/19  Thanks.    Melanie Benson APRN, RN CNP, FNP  [facility] [place]       Event Note

## 2024-12-16 ENCOUNTER — HOSPITAL ENCOUNTER (OUTPATIENT)
Dept: OCCUPATIONAL THERAPY | Facility: HOSPITAL | Age: 64
Setting detail: THERAPIES SERIES
Discharge: HOME | End: 2024-12-16
Payer: COMMERCIAL

## 2024-12-16 DIAGNOSIS — Z47.1 AFTERCARE FOLLOWING RIGHT FINGER JOINT REPLACEMENT SURGERY: ICD-10-CM

## 2024-12-16 DIAGNOSIS — M79.644 THUMB PAIN, RIGHT: Primary | ICD-10-CM

## 2024-12-16 DIAGNOSIS — Z96.691 AFTERCARE FOLLOWING RIGHT FINGER JOINT REPLACEMENT SURGERY: ICD-10-CM

## 2024-12-16 PROCEDURE — 97110 THERAPEUTIC EXERCISES: CPT | Performed by: OCCUPATIONAL THERAPIST

## 2024-12-16 PROCEDURE — 97530 THERAPEUTIC ACTIVITIES: CPT | Performed by: OCCUPATIONAL THERAPIST

## 2024-12-16 NOTE — PROGRESS NOTES
Occupational Therapy Daily Treatment Note      Patient: Lisseth Cota   : 1960  Referring practitioner: JEFF Hoover  Date of Initial Visit: Type: THERAPY  Episode: s/p right thumb CMCJ arthroplasty   Today's Date: 2024  Patient seen for 9 sessions       Visit Diagnoses:    ICD-10-CM ICD-9-CM   1. Thumb pain, right  M79.644 729.5   2. Aftercare following right finger joint replacement surgery  Z47.1 V54.81    Z96.691 V43.69       Subjective Patient agreeable to therapy. Patient with no new complaints today. Patient reports follow up last Friday with MD. Patient states MD is pleased with her progress and to continue therapy as needed.    Objective   See Exercise, Manual, and Modality Logs for complete treatment.       Assessment/Plan Patient tolerated therapy. Patient continues to complete right ROM, strengthening for thumb and hand. Patient emerging in all areas. Continue OT as planned with reassessment next session.      Timed:         Manual Therapy:         mins  73587;     Therapeutic Exercise:    30     mins  29899;     Neuromuscular Chuy:        mins  72893;    Therapeutic Activity:     15     mins  09865;     Gait Training:           mins  33712;     Ultrasound:          mins  27792;    Ionto                                   mins   87706  Self Care                            mins   64473      Un-Timed:  Electrical Stimulation:         mins  48354 ( );  Dry Needling          mins self-pay  Traction          mins 05862  Canalith Repos         mins 88714    Timed Treatment:   45   mins   Total Treatment:     50   mins    Maggie Darby OT  [place] License: [id number]

## 2024-12-19 ENCOUNTER — HOSPITAL ENCOUNTER (OUTPATIENT)
Dept: OCCUPATIONAL THERAPY | Facility: HOSPITAL | Age: 64
Discharge: HOME | End: 2024-12-19
Admitting: PHYSICIAN ASSISTANT
Payer: COMMERCIAL

## 2024-12-19 DIAGNOSIS — M79.644 THUMB PAIN, RIGHT: Primary | ICD-10-CM

## 2024-12-19 PROCEDURE — 97530 THERAPEUTIC ACTIVITIES: CPT | Performed by: OCCUPATIONAL THERAPIST

## 2024-12-19 PROCEDURE — 97110 THERAPEUTIC EXERCISES: CPT | Performed by: OCCUPATIONAL THERAPIST

## 2024-12-19 NOTE — PROGRESS NOTES
Occupational  Therapy Daily Treatment Note      Patient: Lisseth Cota   : 1960  Referring practitioner: JEFF Hoover  Date of Initial Visit: Type: THERAPY  Episode: s/p right thumb CMCJ arthroplasty   Today's Date: 2024  Patient seen for 10 sessions       Visit Diagnoses:    ICD-10-CM ICD-9-CM   1. Thumb pain, right  M79.644 729.5       Subjective Patient agreeable to therapy. Patient with no complaints today.    Objective   See Exercise, Manual, and Modality Logs for complete treatment.       Assessment/Plan   Patient continues to complete ROM and strengthening activities for right hand and thumb. Patient continues to tolerate well and is emerging with increasing right hand functional use. Continue OT as planned.    Timed:         Manual Therapy:         mins  95503;     Therapeutic Exercise:     15    mins  36408;     Neuromuscular Chuy:        mins  42283;    Therapeutic Activity:     25     mins  28154;     Gait Training:           mins  00164;     Ultrasound:          mins  16758;    Ionto                                   mins   02499  Self Care                            mins   61327      Un-Timed:  Electrical Stimulation:         mins  53682 ( );  Dry Needling          mins self-pay  Traction          mins 34826  Canalith Repos         mins 28051    Timed Treatment:  40    mins   Total Treatment:    45    mins    Maggie Darby OT  [place] License: [id number]

## 2024-12-23 ENCOUNTER — HOSPITAL ENCOUNTER (OUTPATIENT)
Dept: OCCUPATIONAL THERAPY | Facility: HOSPITAL | Age: 64
Setting detail: THERAPIES SERIES
Discharge: HOME | End: 2024-12-23
Payer: COMMERCIAL

## 2024-12-23 DIAGNOSIS — Z47.1 AFTERCARE FOLLOWING RIGHT FINGER JOINT REPLACEMENT SURGERY: ICD-10-CM

## 2024-12-23 DIAGNOSIS — M79.644 THUMB PAIN, RIGHT: Primary | ICD-10-CM

## 2024-12-23 DIAGNOSIS — Z96.691 AFTERCARE FOLLOWING RIGHT FINGER JOINT REPLACEMENT SURGERY: ICD-10-CM

## 2024-12-23 PROCEDURE — 97530 THERAPEUTIC ACTIVITIES: CPT | Performed by: OCCUPATIONAL THERAPIST

## 2024-12-23 PROCEDURE — 97110 THERAPEUTIC EXERCISES: CPT | Performed by: OCCUPATIONAL THERAPIST

## 2024-12-23 NOTE — PROGRESS NOTES
Outpatient Occupational Therapy Ortho Treatment Note/Discharge note    Patient: Lisseth Cota   : 1960  Diagnosis/ICD-10 Code:  [unfilled]  Referring practitioner: JEFF Hoover  Date of Initial Visit: 2024  Today's Date: 2024  Patient seen for 11 sessions             Patient Active Problem List   Diagnosis    Ventricular arrhythmia    Routine adult health maintenance    Diffuse large B cell lymphoma (CMS/HCC)    PVC's (premature ventricular contractions)    Flying phobia    Localized osteoporosis with current pathological fracture with routine healing    Subclinical hypothyroidism    Menopause    Closed compression fracture of thoracic vertebra (CMS/HCC)    S/P kyphoplasty            Subjective:  Patient agreeable to therapy today. She states she feels thumb has improved enough to manage at home with HEP and is requesting discharge today. Patient provided with additional HEP with red theraputty. Patieant states pain is much better with complaints mainly at night in which she rates 5-6 at times.           Objective          Active Range of Motion     Right Thumb   Flexion     MP: 32 degrees    DIP: 40 degrees    Strength/Myotome Testing     Right Wrist/Hand      (2nd hand position)     Trial 1: 40 lbs    Trial 2: 42 lbs    Trial 3: 45 lbs    Average: 42.33 lbs    Thumb Strength   Key/Lateral Pinch     Trial 1: 7 lbs          Assessment/Plan  Patient improved with ROM and strength right and thumb. Patient agreeable to dishcarge today. Patient enouraged to contact this office is additional needs arise. Patient independent with appropriate HEP.  Goals  Plan Goals: STG  1.  Patient will be independent with right ROM HEP. met  2.  Patient will improve right thumb ROM 3-5 degrees met  3. Patient will improve right  hand strength 3 -5 lb  met  4. Patient will report decrease pain to 4 or below with use.partially  met     LTG  1. Patient will be independent with HEP provided. met  2. Patient  will improve right thumb ROM to WFL partially met  3. Patient will increase right strength for  and pinch strength. met  4. Patient will increase G/FMC right UE. met  5. Patient will report decrease pain with use right UE.partially met  Timed Codes        Manual Therapy:         mins  87146;    Therapeutic Exercise:   25      mins  95784;     Neuromuscular Chuy:        mins  88478;    Therapeutic Activity:      15    mins  91434;      Ultrasound:          mins  38699;    Community/ work         mins  66738  Self Care/ Rosy         mins  25516  Sensory Re-Int.                  mins   19409  Cognitve Re-train         mins  92080     Un-timed Codes  Electrical Stimulation:         mins 9 98167 ( );        Timed Treatment:   40   mins   Total Treatment:     50   mins    OT SIGNATURE: Maggie Darby, VINNY [id number]   DATE TREATMENT INITIATED: 12/23/2024ambrocio gilbert [facility].

## 2025-01-17 ENCOUNTER — HOSPITAL ENCOUNTER (OUTPATIENT)
Dept: MAMMOGRAPHY | Facility: HOSPITAL | Age: 65
Discharge: HOME | End: 2025-01-17
Admitting: INTERNAL MEDICINE
Payer: COMMERCIAL

## 2025-01-17 DIAGNOSIS — Z12.31 ENCOUNTER FOR SCREENING MAMMOGRAM FOR MALIGNANT NEOPLASM OF BREAST: ICD-10-CM

## 2025-01-17 PROCEDURE — 77067 SCR MAMMO BI INCL CAD: CPT

## 2025-01-17 PROCEDURE — 77067 SCR MAMMO BI INCL CAD: CPT | Performed by: RADIOLOGY

## 2025-01-17 PROCEDURE — 77063 BREAST TOMOSYNTHESIS BI: CPT | Performed by: RADIOLOGY

## 2025-01-17 PROCEDURE — 77063 BREAST TOMOSYNTHESIS BI: CPT

## 2025-02-19 ENCOUNTER — OFFICE VISIT (OUTPATIENT)
Dept: FAMILY MEDICINE CLINIC | Facility: CLINIC | Age: 65
End: 2025-02-19
Payer: COMMERCIAL

## 2025-02-19 VITALS
WEIGHT: 190 LBS | DIASTOLIC BLOOD PRESSURE: 78 MMHG | BODY MASS INDEX: 31.65 KG/M2 | OXYGEN SATURATION: 98 % | HEIGHT: 65 IN | SYSTOLIC BLOOD PRESSURE: 128 MMHG | TEMPERATURE: 98 F | HEART RATE: 90 BPM

## 2025-02-19 DIAGNOSIS — J01.10 ACUTE NON-RECURRENT FRONTAL SINUSITIS: Primary | ICD-10-CM

## 2025-02-19 DIAGNOSIS — R51.9 NONINTRACTABLE HEADACHE, UNSPECIFIED CHRONICITY PATTERN, UNSPECIFIED HEADACHE TYPE: ICD-10-CM

## 2025-02-19 DIAGNOSIS — J02.9 SORE THROAT: ICD-10-CM

## 2025-02-19 DIAGNOSIS — J01.00 ACUTE NON-RECURRENT MAXILLARY SINUSITIS: ICD-10-CM

## 2025-02-19 PROCEDURE — 87880 STREP A ASSAY W/OPTIC: CPT | Performed by: NURSE PRACTITIONER

## 2025-02-19 PROCEDURE — 99213 OFFICE O/P EST LOW 20 MIN: CPT | Performed by: NURSE PRACTITIONER

## 2025-02-19 PROCEDURE — 87428 SARSCOV & INF VIR A&B AG IA: CPT | Performed by: NURSE PRACTITIONER

## 2025-02-19 RX ORDER — DOXYCYCLINE 100 MG/1
100 CAPSULE ORAL 2 TIMES DAILY
Qty: 20 CAPSULE | Refills: 0 | Status: SHIPPED | OUTPATIENT
Start: 2025-02-19

## 2025-02-19 RX ORDER — SODIUM FLUORIDE AND POTASSIUM NITRATE 5.8; 57.5 MG/ML; MG/ML
GEL, DENTIFRICE DENTAL
COMMUNITY
Start: 2024-12-12

## 2025-02-19 NOTE — PROGRESS NOTES
Patient Name: Lisseth Cota Today's Date: 2025   Patient MRN / CSN: [id number] / [id number] Date of Encounter: 2025   Patient Age / : 64 y.o. / 1960 Encounter Provider: ABBIE Buenrostro   Referring Physician: No ref. provider found          Lisseth is a 64 y.o. female who is being seen today for URI (States she started to have a sore throat, chills, headache. )      Sinusitis  This is a new problem. The current episode started in the past 7 days. The problem has been gradually worsening since onset. There has been no fever. The pain is moderate. Associated symptoms include congestion, coughing, sinus pressure and a sore throat. Past treatments include nothing. The treatment provided no relief.       Allergies include:Azithromycin, Penicillins, and Sulfa antibiotics  Current Outpatient Medications   Medication Sig Dispense Refill    esomeprazole (nexIUM) 40 MG capsule TAKE ONE CAPSULE BY MOUTH IN THE MORNING BEFORE BREAKFAST (Patient taking differently: 1 capsule Every Morning Before Breakfast. PRN) 30 capsule 5    estradiol (ESTRACE VAGINAL) 0.1 MG/GM vaginal cream Insert 2 g into the vagina 3 (Three) Times a Week if Needed (vagnitis). 85 g 12    Multiple Vitamin (MULTI-VITAMIN DAILY PO) Take  by mouth 2 (two) times a day.      PreviDent 5000 Sensitive 1.1-5 % gel BRUSH daily      doxycycline (VIBRAMYCIN) 100 MG capsule Take 1 capsule by mouth 2 (Two) Times a Day. 20 capsule 0     No current facility-administered medications for this visit.     Past Medical History:   Diagnosis Date    Endometriosis     Fibrocystic breast     Menopausal symptoms     S/P total abdominal hysterectomy     ovaries remain     Family History   Problem Relation Age of Onset    Heart disease Mother         by-pass    Other Mother         Lupus    Heart disease Father     Neuropathy Father     Hypertension Father     Breast cancer Neg Hx     Uterine cancer Neg Hx     Endometrial cancer Neg Hx     Ovarian cancer Neg Hx   "   Colon cancer Neg Hx      Past Surgical History:   Procedure Laterality Date    BLADDER SURGERY       SECTION       SECTION       SECTION      COLONOSCOPY N/A 12/15/2017    Procedure: COLONOSCOPY FOR SCREENING  CPTCODE:76721;  Surgeon: Bimal Hoang III, MD;  Location: [facility];  Service:     DILATATION AND CURETTAGE      HAND SURGERY Right     JOINT REPLACEMENT  10/20/2017    Patial left kneee    KNEE SURGERY  10/25/2018    left knee    ORTHOPEDIC SURGERY Right 10/08/2024    hand    TOTAL ABDOMINAL HYSTERECTOMY  2011     Social History     Substance and Sexual Activity   Alcohol Use Not Currently    Comment: Occasional social drink     Social History     Tobacco Use   Smoking Status Never   Smokeless Tobacco Never     Social History     Substance and Sexual Activity   Drug Use Never     Review of Systems   HENT:  Positive for congestion, sinus pressure and sore throat.    Respiratory:  Positive for cough.         Depression Assessment Review:  PHQ-9 Total Score:    Vital Signs & Measurements Taken This Encounter  /78 (BP Location: Left arm, Patient Position: Sitting, Cuff Size: Adult)   Pulse 90   Temp 98 °F (36.7 °C) (Oral)   Ht 165.1 cm (65\")   Wt 86.2 kg (190 lb)   SpO2 98%   BMI 31.62 kg/m²    SpO2 Percentage    25 1605   SpO2: 98%               Physical Exam  Constitutional:       Appearance: Normal appearance.   HENT:      Right Ear: External ear normal.      Left Ear: External ear normal.      Nose: Nose normal. Congestion present.      Right Sinus: Frontal sinus tenderness present.      Left Sinus: Frontal sinus tenderness present.      Mouth/Throat:      Mouth: Mucous membranes are moist.   Eyes:      Pupils: Pupils are equal, round, and reactive to light.   Cardiovascular:      Rate and Rhythm: Normal rate and regular rhythm.      Pulses: Normal pulses.   Pulmonary:      Effort: Pulmonary effort is normal.   Abdominal:      Palpations: " Abdomen is soft.   Musculoskeletal:         General: Normal range of motion.   Skin:     General: Skin is warm.   Neurological:      General: No focal deficit present.      Mental Status: She is alert.   Psychiatric:         Mood and Affect: Mood normal.         Behavior: Behavior normal.          Fall Risk Assessment:  Fall Risk Assessment was completed, and patient is at low risk for falls.    Assessment & Plan  Patient Active Problem List   Diagnosis    Colon cancer screening    Gastroesophageal reflux disease       ICD-10-CM ICD-9-CM   1. Acute non-recurrent frontal sinusitis  J01.10 461.1   2. Acute non-recurrent maxillary sinusitis  J01.00 461.0   3. Sore throat  J02.9 462   4. Nonintractable headache, unspecified chronicity pattern, unspecified headache type  R51.9 784.0     Diagnoses and all orders for this visit:    1. Acute non-recurrent frontal sinusitis (Primary)  -     doxycycline (VIBRAMYCIN) 100 MG capsule; Take 1 capsule by mouth 2 (Two) Times a Day.  Dispense: 20 capsule; Refill: 0    2. Acute non-recurrent maxillary sinusitis  -     doxycycline (VIBRAMYCIN) 100 MG capsule; Take 1 capsule by mouth 2 (Two) Times a Day.  Dispense: 20 capsule; Refill: 0    3. Sore throat  -     POCT SARS-CoV-2 Antigen KAMERON + Flu  -     POC Rapid Strep A    4. Nonintractable headache, unspecified chronicity pattern, unspecified headache type  -     POCT SARS-CoV-2 Antigen KAMERON + Flu       --- She is negative for COVID, flu, strep in office today.  She does have cough congestion sore throat and sinus pain and pressure.  For the sinusitis I will start her on doxycycline.      Follow-up on file with Dr. Fan.           This document has been electronically signed by ABBIE Buenrostro  February 19, 2025 16:40 EST    ABBIE Buenrostro  [address] [place] [address]  [phone] (office)    Part of this note may be an electronic transcription/translation of spoken language to printed text using the Dragon  Dictation System.

## 2025-04-11 ENCOUNTER — OFFICE VISIT (OUTPATIENT)
Dept: FAMILY MEDICINE CLINIC | Facility: CLINIC | Age: 65
End: 2025-04-11
Payer: COMMERCIAL

## 2025-04-11 VITALS
OXYGEN SATURATION: 97 % | WEIGHT: 188.8 LBS | SYSTOLIC BLOOD PRESSURE: 126 MMHG | HEIGHT: 65 IN | DIASTOLIC BLOOD PRESSURE: 78 MMHG | TEMPERATURE: 97.9 F | HEART RATE: 99 BPM | BODY MASS INDEX: 31.46 KG/M2

## 2025-04-11 DIAGNOSIS — M79.644 PAIN OF RIGHT THUMB: Primary | ICD-10-CM

## 2025-04-11 NOTE — PROGRESS NOTES
Patient Name: Lisseth Cota Today's Date: 2025   Patient MRN / CSN: [id number] / [id number] Date of Encounter: 2025   Patient Age / : 64 y.o. / 1960 Encounter Provider: ABBIE Buenrostro   Referring Physician: No ref. provider found          Lisseth is a 64 y.o. female who is being seen today for Hand Pain (Right thumb pain since yesterday. Pt states she slipped yesterday and went to catch herself and landed on her thumb. )      Hand Pain   The incident occurred 12 to 24 hours ago. The injury mechanism was a fall. The quality of the pain is described as aching and stabbing. The pain does not radiate. The pain has been Fluctuating since the incident. The symptoms are aggravated by movement. She has tried nothing for the symptoms. The treatment provided no relief.       Allergies include:Azithromycin, Penicillins, and Sulfa antibiotics  Current Outpatient Medications   Medication Sig Dispense Refill    esomeprazole (nexIUM) 40 MG capsule TAKE ONE CAPSULE BY MOUTH IN THE MORNING BEFORE BREAKFAST (Patient taking differently: 1 capsule Every Morning Before Breakfast. PRN) 30 capsule 5    Multiple Vitamin (MULTI-VITAMIN DAILY PO) Take  by mouth 2 (two) times a day.      PreviDent 5000 Sensitive 1.1-5 % gel BRUSH daily      estradiol (ESTRACE VAGINAL) 0.1 MG/GM vaginal cream Insert 2 g into the vagina 3 (Three) Times a Week if Needed (vagnitis). 85 g 12     No current facility-administered medications for this visit.     Past Medical History:   Diagnosis Date    Endometriosis     Fibrocystic breast     Menopausal symptoms     S/P total abdominal hysterectomy     ovaries remain     Family History   Problem Relation Age of Onset    Heart disease Mother         by-pass    Other Mother         Lupus    Heart disease Father     Neuropathy Father     Hypertension Father     Breast cancer Neg Hx     Uterine cancer Neg Hx     Endometrial cancer Neg Hx     Ovarian cancer Neg Hx     Colon cancer Neg Hx      Past  "Surgical History:   Procedure Laterality Date    BLADDER SURGERY       SECTION       SECTION       SECTION      COLONOSCOPY N/A 12/15/2017    Procedure: COLONOSCOPY FOR SCREENING  CPTCODE:63770;  Surgeon: Bimal Hoang III, MD;  Location: [facility] OR;  Service:     DILATATION AND CURETTAGE      HAND SURGERY Right     JOINT REPLACEMENT  10/20/2017    Patial left kneee    KNEE SURGERY  10/25/2018    left knee    ORTHOPEDIC SURGERY Right 10/08/2024    hand    TOTAL ABDOMINAL HYSTERECTOMY  2011     Social History     Substance and Sexual Activity   Alcohol Use Not Currently    Comment: Occasional social drink     Social History     Tobacco Use   Smoking Status Never   Smokeless Tobacco Never     Social History     Substance and Sexual Activity   Drug Use Never     Review of Systems   Musculoskeletal:  Positive for joint swelling.        Depression Assessment Review:  PHQ-9 Total Score:    Vital Signs & Measurements Taken This Encounter  /78 (BP Location: Left arm, Patient Position: Sitting, Cuff Size: Adult)   Pulse 99   Temp 97.9 °F (36.6 °C) (Oral)   Ht 165.1 cm (65\")   Wt 85.6 kg (188 lb 12.8 oz)   SpO2 97%   BMI 31.42 kg/m²    SpO2 Percentage    25 0857   SpO2: 97%               Physical Exam  Constitutional:       Appearance: Normal appearance.   HENT:      Right Ear: External ear normal.      Left Ear: External ear normal.      Nose: Nose normal.      Mouth/Throat:      Mouth: Mucous membranes are moist.   Eyes:      Pupils: Pupils are equal, round, and reactive to light.   Cardiovascular:      Rate and Rhythm: Normal rate and regular rhythm.      Pulses: Normal pulses.   Pulmonary:      Effort: Pulmonary effort is normal.   Abdominal:      Palpations: Abdomen is soft.   Musculoskeletal:      Right hand: Swelling and tenderness present.      Comments: Tenderness and swelling of the right thumb.    Skin:     General: Skin is warm.   Neurological:      " General: No focal deficit present.      Mental Status: She is alert and oriented to person, place, and time.   Psychiatric:         Mood and Affect: Mood normal.         Behavior: Behavior normal.          Fall Risk Assessment:  Fall Risk Assessment was completed, and patient is at low risk for falls.    Assessment & Plan  Patient Active Problem List   Diagnosis    Colon cancer screening    Gastroesophageal reflux disease       ICD-10-CM ICD-9-CM   1. Pain of right thumb  M79.644 729.5     Diagnoses and all orders for this visit:    1. Pain of right thumb (Primary)  -     XR Hand 3+ View Right; Future     -- She presents today with right thumb pain.  She states she had a fall yesterday and caught herself with her hand.  Since then her thumb has been tender, swollen and pain with movement.  Will order x-rays of the right hand and notify her of the results and further plan of care.        Follow-up after x-ray.           This document has been electronically signed by ABBIE Buenrostro  April 11, 2025 16:10 EDT    ABBIE Buenrostro  [address] [place] [address] [phone] (office)    Part of this note may be an electronic transcription/translation of spoken language to printed text using the Dragon Dictation System.

## 2025-05-23 DIAGNOSIS — K21.9 GASTROESOPHAGEAL REFLUX DISEASE, UNSPECIFIED WHETHER ESOPHAGITIS PRESENT: ICD-10-CM

## 2025-05-23 RX ORDER — ESOMEPRAZOLE MAGNESIUM 40 MG/1
40 CAPSULE, DELAYED RELEASE ORAL
Qty: 30 CAPSULE | Refills: 3 | Status: SHIPPED | OUTPATIENT
Start: 2025-05-23

## 2025-05-29 ENCOUNTER — OFFICE VISIT (OUTPATIENT)
Dept: FAMILY MEDICINE CLINIC | Facility: CLINIC | Age: 65
End: 2025-05-29
Payer: COMMERCIAL

## 2025-05-29 VITALS
RESPIRATION RATE: 16 BRPM | WEIGHT: 187 LBS | SYSTOLIC BLOOD PRESSURE: 120 MMHG | DIASTOLIC BLOOD PRESSURE: 60 MMHG | BODY MASS INDEX: 31.16 KG/M2 | TEMPERATURE: 98.4 F | HEIGHT: 65 IN | HEART RATE: 84 BPM | OXYGEN SATURATION: 97 %

## 2025-05-29 DIAGNOSIS — J06.9 ACUTE URI: Primary | ICD-10-CM

## 2025-05-29 PROCEDURE — 99213 OFFICE O/P EST LOW 20 MIN: CPT | Performed by: NURSE PRACTITIONER

## 2025-05-29 RX ORDER — IPRATROPIUM BROMIDE 42 UG/1
2 SPRAY, METERED NASAL 4 TIMES DAILY
Qty: 15 ML | Refills: 12 | Status: SHIPPED | OUTPATIENT
Start: 2025-05-29

## 2025-05-29 NOTE — PROGRESS NOTES
"Subjective   Lisseth Cota is a 64 y.o. female.     Chief Complaint   Patient presents with    URI       History of Present Illness  She presents with c/o sinus congestion and cough since Monday. She has taken zinc and vitamin c. She denies fever. She has been drinking airborne.        The following portions of the patient's history were reviewed and updated as appropriate: allergies, current medications, past family history, past medical history, past social history, past surgical history and problem list.    Review of Systems   Constitutional:  Negative for fever.   HENT:  Positive for congestion and sore throat.    Respiratory:  Positive for cough. Negative for shortness of breath and wheezing.    Neurological:  Positive for headaches.       Objective     /60 (BP Location: Right arm, Patient Position: Sitting, Cuff Size: Adult)   Pulse 84   Temp 98.4 °F (36.9 °C) (Tympanic)   Resp 16   Ht 165.1 cm (65\")   Wt 84.8 kg (187 lb)   LMP  (LMP Unknown)   SpO2 97%   BMI 31.12 kg/m²     Physical Exam  Vitals reviewed.   Constitutional:       General: She is not in acute distress.     Appearance: Normal appearance. She is well-developed. She is not diaphoretic.   HENT:      Head: Normocephalic and atraumatic.      Right Ear: Hearing, tympanic membrane, ear canal and external ear normal.      Left Ear: Hearing, tympanic membrane, ear canal and external ear normal.      Nose: Congestion present.      Right Sinus: No maxillary sinus tenderness or frontal sinus tenderness.      Left Sinus: No maxillary sinus tenderness or frontal sinus tenderness.      Mouth/Throat:      Pharynx: Uvula midline. Pharyngeal swelling and posterior oropharyngeal erythema present.   Eyes:      General: Lids are normal.      Conjunctiva/sclera: Conjunctivae normal.   Neck:      Trachea: Trachea normal. No tracheal tenderness or tracheal deviation.   Cardiovascular:      Rate and Rhythm: Normal rate and regular rhythm.      Heart sounds: " Normal heart sounds, S1 normal and S2 normal. No murmur heard.     No friction rub. No gallop.   Pulmonary:      Effort: Pulmonary effort is normal. No respiratory distress.      Breath sounds: Normal breath sounds.   Abdominal:      General: Bowel sounds are normal. There is no distension.      Palpations: Abdomen is soft.      Tenderness: There is no abdominal tenderness.   Lymphadenopathy:      Cervical: No cervical adenopathy.   Skin:     General: Skin is warm and dry.   Neurological:      Mental Status: She is alert and oriented to person, place, and time.   Psychiatric:         Behavior: Behavior normal.         Thought Content: Thought content normal.         Judgment: Judgment normal.         Current Outpatient Medications   Medication Sig Dispense Refill    esomeprazole (nexIUM) 40 MG capsule Take 1 capsule by mouth Every Morning Before Breakfast. PRN 30 capsule 3    estradiol (ESTRACE VAGINAL) 0.1 MG/GM vaginal cream Insert 2 g into the vagina 3 (Three) Times a Week if Needed (vagnitis). 85 g 12    Multiple Vitamin (MULTI-VITAMIN DAILY PO) Take  by mouth 2 (two) times a day.      PreviDent 5000 Sensitive 1.1-5 % gel BRUSH daily      amoxicillin-clavulanate (AUGMENTIN) 875-125 MG per tablet Take 1 tablet by mouth 2 (Two) Times a Day. 20 tablet 0    ipratropium (ATROVENT) 0.06 % nasal spray Administer 2 sprays into the nostril(s) as directed by provider 4 (Four) Times a Day. 15 mL 12     No current facility-administered medications for this visit.            Assessment & Plan     Problem List Items Addressed This Visit    None  Visit Diagnoses         Acute URI    -  Primary    Relevant Medications    amoxicillin-clavulanate (AUGMENTIN) 875-125 MG per tablet    ipratropium (ATROVENT) 0.06 % nasal spray              ICD-10-CM ICD-9-CM   1. Acute URI  J06.9 465.9       Plan: She declines COVID and flu swab.  Augmentin and Atrovent ordered for acute URI.  Keep scheduled follow-up and follow-up as  needed.    @Body mass index is 31.12 kg/m².           Understands disease processes and need for medications.  Understands reasons for urgent and emergent care.  Patient (& family) verbalized agreement for treatment plan.   Emotional support and active listening provided.  Patient provided time to verbalize feelings.                  This document has been electronically signed by ABBIE Schwartz   May 29, 2025 11:12 EDT

## (undated) DEVICE — Device: Brand: DEFENDO AIR/WATER/SUCTION AND BIOPSY VALVE

## (undated) DEVICE — Device: Brand: ENDOGATOR

## (undated) DEVICE — TUBING, SUCTION, 1/4" X 20', STRAIGHT: Brand: MEDLINE INDUSTRIES, INC.

## (undated) DEVICE — CONN Y IRR DISP 1P/U

## (undated) DEVICE — SYR LUERLOK 30CC

## (undated) DEVICE — SINGLE PORT MANIFOLD: Brand: NEPTUNE 2

## (undated) DEVICE — Device

## (undated) DEVICE — GOWN,REINF,POLY,ECL,PP SLV,XL: Brand: MEDLINE